# Patient Record
Sex: FEMALE | Race: WHITE | NOT HISPANIC OR LATINO | URBAN - METROPOLITAN AREA
[De-identification: names, ages, dates, MRNs, and addresses within clinical notes are randomized per-mention and may not be internally consistent; named-entity substitution may affect disease eponyms.]

---

## 2019-04-26 ENCOUNTER — OUTPATIENT (OUTPATIENT)
Dept: OUTPATIENT SERVICES | Facility: HOSPITAL | Age: 74
LOS: 1 days | End: 2019-04-26
Payer: MEDICARE

## 2019-04-26 ENCOUNTER — RESULT REVIEW (OUTPATIENT)
Age: 74
End: 2019-04-26

## 2019-04-26 ENCOUNTER — APPOINTMENT (OUTPATIENT)
Dept: ULTRASOUND IMAGING | Facility: CLINIC | Age: 74
End: 2019-04-26
Payer: MEDICARE

## 2019-04-26 DIAGNOSIS — N60.02 SOLITARY CYST OF LEFT BREAST: ICD-10-CM

## 2019-04-26 DIAGNOSIS — Z00.8 ENCOUNTER FOR OTHER GENERAL EXAMINATION: ICD-10-CM

## 2019-04-26 PROCEDURE — 19084 BX BREAST ADD LESION US IMAG: CPT | Mod: LT

## 2019-04-26 PROCEDURE — 19083 BX BREAST 1ST LESION US IMAG: CPT

## 2019-04-26 PROCEDURE — 88305 TISSUE EXAM BY PATHOLOGIST: CPT | Mod: 26

## 2019-04-26 PROCEDURE — 77065 DX MAMMO INCL CAD UNI: CPT

## 2019-04-26 PROCEDURE — 88377 M/PHMTRC ALYS ISHQUANT/SEMIQ: CPT | Mod: 26

## 2019-04-26 PROCEDURE — A4648: CPT

## 2019-04-26 PROCEDURE — 88305 TISSUE EXAM BY PATHOLOGIST: CPT

## 2019-04-26 PROCEDURE — 19083 BX BREAST 1ST LESION US IMAG: CPT | Mod: LT

## 2019-04-26 PROCEDURE — 88377 M/PHMTRC ALYS ISHQUANT/SEMIQ: CPT

## 2019-04-26 PROCEDURE — 88360 TUMOR IMMUNOHISTOCHEM/MANUAL: CPT

## 2019-04-26 PROCEDURE — 77065 DX MAMMO INCL CAD UNI: CPT | Mod: 26,LT

## 2019-04-26 PROCEDURE — 88360 TUMOR IMMUNOHISTOCHEM/MANUAL: CPT | Mod: 26

## 2019-04-26 PROCEDURE — 19084 BX BREAST ADD LESION US IMAG: CPT

## 2019-04-29 LAB — SURGICAL PATHOLOGY STUDY: SIGNIFICANT CHANGE UP

## 2019-08-16 ENCOUNTER — APPOINTMENT (OUTPATIENT)
Dept: RADIOLOGY | Facility: HOSPITAL | Age: 74
End: 2019-08-16
Payer: MEDICARE

## 2019-08-16 ENCOUNTER — OUTPATIENT (OUTPATIENT)
Dept: OUTPATIENT SERVICES | Facility: HOSPITAL | Age: 74
LOS: 1 days | End: 2019-08-16
Payer: MEDICARE

## 2019-08-16 DIAGNOSIS — Z00.8 ENCOUNTER FOR OTHER GENERAL EXAMINATION: ICD-10-CM

## 2019-08-16 PROCEDURE — 77080 DXA BONE DENSITY AXIAL: CPT | Mod: 26

## 2019-08-16 PROCEDURE — 77080 DXA BONE DENSITY AXIAL: CPT

## 2021-04-05 ENCOUNTER — APPOINTMENT (OUTPATIENT)
Dept: ULTRASOUND IMAGING | Facility: HOSPITAL | Age: 76
End: 2021-04-05
Payer: MEDICARE

## 2021-04-05 ENCOUNTER — OUTPATIENT (OUTPATIENT)
Dept: OUTPATIENT SERVICES | Facility: HOSPITAL | Age: 76
LOS: 1 days | End: 2021-04-05
Payer: MEDICARE

## 2021-04-05 DIAGNOSIS — Z00.8 ENCOUNTER FOR OTHER GENERAL EXAMINATION: ICD-10-CM

## 2021-04-05 PROCEDURE — 76770 US EXAM ABDO BACK WALL COMP: CPT | Mod: 26

## 2021-04-05 PROCEDURE — 76770 US EXAM ABDO BACK WALL COMP: CPT

## 2022-03-07 ENCOUNTER — APPOINTMENT (OUTPATIENT)
Dept: MAMMOGRAPHY | Facility: CLINIC | Age: 77
End: 2022-03-07
Payer: MEDICARE

## 2022-03-07 ENCOUNTER — APPOINTMENT (OUTPATIENT)
Dept: ULTRASOUND IMAGING | Facility: CLINIC | Age: 77
End: 2022-03-07
Payer: MEDICARE

## 2022-03-07 PROCEDURE — 76641 ULTRASOUND BREAST COMPLETE: CPT | Mod: RT

## 2022-03-07 PROCEDURE — G0279: CPT | Mod: RT

## 2022-03-07 PROCEDURE — 77065 DX MAMMO INCL CAD UNI: CPT | Mod: RT

## 2022-03-10 ENCOUNTER — TRANSCRIPTION ENCOUNTER (OUTPATIENT)
Age: 77
End: 2022-03-10

## 2022-06-13 ENCOUNTER — OUTPATIENT (OUTPATIENT)
Dept: OUTPATIENT SERVICES | Facility: HOSPITAL | Age: 77
LOS: 1 days | End: 2022-06-13
Payer: MEDICARE

## 2022-06-13 ENCOUNTER — APPOINTMENT (OUTPATIENT)
Dept: RADIOLOGY | Facility: HOSPITAL | Age: 77
End: 2022-06-13
Payer: MEDICARE

## 2022-06-13 DIAGNOSIS — M81.0 AGE-RELATED OSTEOPOROSIS WITHOUT CURRENT PATHOLOGICAL FRACTURE: ICD-10-CM

## 2022-06-13 PROCEDURE — 77080 DXA BONE DENSITY AXIAL: CPT

## 2022-06-13 PROCEDURE — 77080 DXA BONE DENSITY AXIAL: CPT | Mod: 26

## 2022-11-19 ENCOUNTER — NON-APPOINTMENT (OUTPATIENT)
Age: 77
End: 2022-11-19

## 2022-11-27 ENCOUNTER — INPATIENT (INPATIENT)
Facility: HOSPITAL | Age: 77
LOS: 2 days | Discharge: HOME | DRG: 308 | End: 2022-11-30
Attending: INTERNAL MEDICINE | Admitting: HOSPITALIST
Payer: MEDICARE

## 2022-11-27 VITALS
TEMPERATURE: 98 F | WEIGHT: 205.03 LBS | SYSTOLIC BLOOD PRESSURE: 125 MMHG | OXYGEN SATURATION: 97 % | HEIGHT: 64 IN | RESPIRATION RATE: 18 BRPM | HEART RATE: 134 BPM | DIASTOLIC BLOOD PRESSURE: 71 MMHG

## 2022-11-27 DIAGNOSIS — Z96.659 PRESENCE OF UNSPECIFIED ARTIFICIAL KNEE JOINT: Chronic | ICD-10-CM

## 2022-11-27 DIAGNOSIS — I48.91 UNSPECIFIED ATRIAL FIBRILLATION: ICD-10-CM

## 2022-11-27 DIAGNOSIS — Z90.12 ACQUIRED ABSENCE OF LEFT BREAST AND NIPPLE: Chronic | ICD-10-CM

## 2022-11-27 LAB
ALBUMIN SERPL ELPH-MCNC: 3.3 G/DL — SIGNIFICANT CHANGE UP (ref 3.3–5)
ALP SERPL-CCNC: 69 U/L — SIGNIFICANT CHANGE UP (ref 40–120)
ALT FLD-CCNC: 18 U/L — SIGNIFICANT CHANGE UP (ref 10–45)
ANION GAP SERPL CALC-SCNC: 12 MMOL/L — SIGNIFICANT CHANGE UP (ref 5–17)
AST SERPL-CCNC: 22 U/L — SIGNIFICANT CHANGE UP (ref 10–40)
BASOPHILS # BLD AUTO: 0.06 K/UL — SIGNIFICANT CHANGE UP (ref 0–0.2)
BASOPHILS NFR BLD AUTO: 0.5 % — SIGNIFICANT CHANGE UP (ref 0–2)
BILIRUB SERPL-MCNC: 0.5 MG/DL — SIGNIFICANT CHANGE UP (ref 0.2–1.2)
BUN SERPL-MCNC: 15 MG/DL — SIGNIFICANT CHANGE UP (ref 7–23)
CALCIUM SERPL-MCNC: 9.2 MG/DL — SIGNIFICANT CHANGE UP (ref 8.4–10.5)
CHLORIDE SERPL-SCNC: 105 MMOL/L — SIGNIFICANT CHANGE UP (ref 96–108)
CO2 SERPL-SCNC: 25 MMOL/L — SIGNIFICANT CHANGE UP (ref 22–31)
CREAT SERPL-MCNC: 0.91 MG/DL — SIGNIFICANT CHANGE UP (ref 0.5–1.3)
EGFR: 65 ML/MIN/1.73M2 — SIGNIFICANT CHANGE UP
EOSINOPHIL # BLD AUTO: 0.07 K/UL — SIGNIFICANT CHANGE UP (ref 0–0.5)
EOSINOPHIL NFR BLD AUTO: 0.6 % — SIGNIFICANT CHANGE UP (ref 0–6)
FLUAV AG NPH QL: SIGNIFICANT CHANGE UP
FLUBV AG NPH QL: SIGNIFICANT CHANGE UP
GLUCOSE SERPL-MCNC: 111 MG/DL — HIGH (ref 70–99)
HCT VFR BLD CALC: 43.5 % — SIGNIFICANT CHANGE UP (ref 34.5–45)
HGB BLD-MCNC: 14.4 G/DL — SIGNIFICANT CHANGE UP (ref 11.5–15.5)
IMM GRANULOCYTES NFR BLD AUTO: 0.4 % — SIGNIFICANT CHANGE UP (ref 0–0.9)
LYMPHOCYTES # BLD AUTO: 1.89 K/UL — SIGNIFICANT CHANGE UP (ref 1–3.3)
LYMPHOCYTES # BLD AUTO: 15.2 % — SIGNIFICANT CHANGE UP (ref 13–44)
MCHC RBC-ENTMCNC: 29.8 PG — SIGNIFICANT CHANGE UP (ref 27–34)
MCHC RBC-ENTMCNC: 33.1 GM/DL — SIGNIFICANT CHANGE UP (ref 32–36)
MCV RBC AUTO: 89.9 FL — SIGNIFICANT CHANGE UP (ref 80–100)
MONOCYTES # BLD AUTO: 1.01 K/UL — HIGH (ref 0–0.9)
MONOCYTES NFR BLD AUTO: 8.1 % — SIGNIFICANT CHANGE UP (ref 2–14)
NEUTROPHILS # BLD AUTO: 9.37 K/UL — HIGH (ref 1.8–7.4)
NEUTROPHILS NFR BLD AUTO: 75.2 % — SIGNIFICANT CHANGE UP (ref 43–77)
NRBC # BLD: 0 /100 WBCS — SIGNIFICANT CHANGE UP (ref 0–0)
NT-PROBNP SERPL-SCNC: 1748 PG/ML — HIGH (ref 0–300)
PLATELET # BLD AUTO: 277 K/UL — SIGNIFICANT CHANGE UP (ref 150–400)
POTASSIUM SERPL-MCNC: 4.1 MMOL/L — SIGNIFICANT CHANGE UP (ref 3.5–5.3)
POTASSIUM SERPL-SCNC: 4.1 MMOL/L — SIGNIFICANT CHANGE UP (ref 3.5–5.3)
PROT SERPL-MCNC: 7.6 G/DL — SIGNIFICANT CHANGE UP (ref 6–8.3)
RBC # BLD: 4.84 M/UL — SIGNIFICANT CHANGE UP (ref 3.8–5.2)
RBC # FLD: 12.5 % — SIGNIFICANT CHANGE UP (ref 10.3–14.5)
RSV RNA NPH QL NAA+NON-PROBE: SIGNIFICANT CHANGE UP
SARS-COV-2 RNA SPEC QL NAA+PROBE: SIGNIFICANT CHANGE UP
SODIUM SERPL-SCNC: 142 MMOL/L — SIGNIFICANT CHANGE UP (ref 135–145)
TROPONIN I, HIGH SENSITIVITY RESULT: 14.3 NG/L — SIGNIFICANT CHANGE UP
TROPONIN I, HIGH SENSITIVITY RESULT: 17.8 NG/L — SIGNIFICANT CHANGE UP
TSH SERPL-MCNC: 1.38 UIU/ML — SIGNIFICANT CHANGE UP (ref 0.36–3.74)
WBC # BLD: 12.45 K/UL — HIGH (ref 3.8–10.5)
WBC # FLD AUTO: 12.45 K/UL — HIGH (ref 3.8–10.5)

## 2022-11-27 PROCEDURE — 71275 CT ANGIOGRAPHY CHEST: CPT | Mod: 26

## 2022-11-27 PROCEDURE — 71045 X-RAY EXAM CHEST 1 VIEW: CPT | Mod: 26

## 2022-11-27 PROCEDURE — 99223 1ST HOSP IP/OBS HIGH 75: CPT

## 2022-11-27 PROCEDURE — 99291 CRITICAL CARE FIRST HOUR: CPT

## 2022-11-27 PROCEDURE — 93010 ELECTROCARDIOGRAM REPORT: CPT

## 2022-11-27 RX ORDER — DILTIAZEM HCL 120 MG
30 CAPSULE, EXT RELEASE 24 HR ORAL ONCE
Refills: 0 | Status: COMPLETED | OUTPATIENT
Start: 2022-11-27 | End: 2022-11-27

## 2022-11-27 RX ORDER — ENOXAPARIN SODIUM 100 MG/ML
90 INJECTION SUBCUTANEOUS EVERY 12 HOURS
Refills: 0 | Status: DISCONTINUED | OUTPATIENT
Start: 2022-11-27 | End: 2022-11-29

## 2022-11-27 RX ORDER — DILTIAZEM HCL 120 MG
10 CAPSULE, EXT RELEASE 24 HR ORAL ONCE
Refills: 0 | Status: COMPLETED | OUTPATIENT
Start: 2022-11-27 | End: 2022-11-27

## 2022-11-27 RX ORDER — LISINOPRIL 2.5 MG/1
0 TABLET ORAL
Qty: 0 | Refills: 0 | DISCHARGE

## 2022-11-27 RX ORDER — ACETAMINOPHEN 500 MG
650 TABLET ORAL EVERY 6 HOURS
Refills: 0 | Status: DISCONTINUED | OUTPATIENT
Start: 2022-11-27 | End: 2022-11-30

## 2022-11-27 RX ORDER — ANASTROZOLE 1 MG/1
1 TABLET ORAL DAILY
Refills: 0 | Status: DISCONTINUED | OUTPATIENT
Start: 2022-11-27 | End: 2022-11-30

## 2022-11-27 RX ORDER — METHOCARBAMOL 500 MG/1
750 TABLET, FILM COATED ORAL ONCE
Refills: 0 | Status: COMPLETED | OUTPATIENT
Start: 2022-11-27 | End: 2022-11-27

## 2022-11-27 RX ORDER — LANOLIN ALCOHOL/MO/W.PET/CERES
3 CREAM (GRAM) TOPICAL AT BEDTIME
Refills: 0 | Status: DISCONTINUED | OUTPATIENT
Start: 2022-11-27 | End: 2022-11-30

## 2022-11-27 RX ORDER — DILTIAZEM HCL 120 MG
30 CAPSULE, EXT RELEASE 24 HR ORAL EVERY 6 HOURS
Refills: 0 | Status: DISCONTINUED | OUTPATIENT
Start: 2022-11-27 | End: 2022-11-28

## 2022-11-27 RX ORDER — SODIUM CHLORIDE 9 MG/ML
1000 INJECTION INTRAMUSCULAR; INTRAVENOUS; SUBCUTANEOUS ONCE
Refills: 0 | Status: COMPLETED | OUTPATIENT
Start: 2022-11-27 | End: 2022-11-27

## 2022-11-27 RX ORDER — ACETAMINOPHEN 500 MG
650 TABLET ORAL ONCE
Refills: 0 | Status: COMPLETED | OUTPATIENT
Start: 2022-11-27 | End: 2022-11-27

## 2022-11-27 RX ORDER — LEVOFLOXACIN 5 MG/ML
500 INJECTION, SOLUTION INTRAVENOUS EVERY 24 HOURS
Refills: 0 | Status: DISCONTINUED | OUTPATIENT
Start: 2022-11-28 | End: 2022-11-28

## 2022-11-27 RX ORDER — ONDANSETRON 8 MG/1
4 TABLET, FILM COATED ORAL EVERY 8 HOURS
Refills: 0 | Status: DISCONTINUED | OUTPATIENT
Start: 2022-11-27 | End: 2022-11-30

## 2022-11-27 RX ADMIN — Medication 10 MILLIGRAM(S): at 13:54

## 2022-11-27 RX ADMIN — Medication 30 MILLIGRAM(S): at 16:03

## 2022-11-27 RX ADMIN — Medication 650 MILLIGRAM(S): at 13:55

## 2022-11-27 RX ADMIN — ENOXAPARIN SODIUM 90 MILLIGRAM(S): 100 INJECTION SUBCUTANEOUS at 19:20

## 2022-11-27 RX ADMIN — SODIUM CHLORIDE 1000 MILLILITER(S): 9 INJECTION INTRAMUSCULAR; INTRAVENOUS; SUBCUTANEOUS at 13:54

## 2022-11-27 RX ADMIN — Medication 10 MILLIGRAM(S): at 16:04

## 2022-11-27 RX ADMIN — SODIUM CHLORIDE 1000 MILLILITER(S): 9 INJECTION INTRAMUSCULAR; INTRAVENOUS; SUBCUTANEOUS at 15:00

## 2022-11-27 RX ADMIN — METHOCARBAMOL 750 MILLIGRAM(S): 500 TABLET, FILM COATED ORAL at 13:55

## 2022-11-27 NOTE — ED ADULT TRIAGE NOTE - CHIEF COMPLAINT QUOTE
sent Dr. Blank for CXR. Pt has had cough x 3 weeks. sent Dr. Blank for CXR. Pt has had cough x 3 weeks. pt tachycardic in triage

## 2022-11-27 NOTE — ED ADULT NURSE REASSESSMENT NOTE - NS ED NURSE REASSESS COMMENT FT1
Pt provided with dinner tray. Purposeful rounding performed. Call bell within reach. Son at bedside.

## 2022-11-27 NOTE — ED PROVIDER NOTE - CLINICAL SUMMARY MEDICAL DECISION MAKING FREE TEXT BOX
77-year-old female past medical history of hypertension presents to the ED complaining of cough for at least 1 week.  Positive congestion.  Patient went to urgent care and was given medication, Tessalon Perles and has been taking Mucinex as needed for cough as well.  Patient notes that recently she developed a knot in her neck on the right side posterior aspect due to significant coughing.  Patient states she massaged it a bit and took some over-the-counter Tylenol with relief.  However still reports some stiffness.  No chest pain or palpitations.  No shortness of breath.  Patient called her primary care doctor and told her to come to the ER for a chest x-ray.  No fever reported.  Exam as stated.  Patient noted to be tachycardic with an irregular rhythm.  EKG demonstrates rapid A. fib.  New for patient.  She has no past medical history of atrial fibrillation.  Patient denies palpitations and chest pain.  No dizziness or shortness of breath.  Likely situational.  We will medically manage and support.  Reassess vital signs.  Flu COVID RSV test sent 77-year-old female past medical history of hypertension presents to the ED complaining of cough for at least 1 week.  Positive congestion.  Patient went to urgent care and was given medication, Tessalon Perles and has been taking Mucinex as needed for cough as well.  Patient notes that recently she developed a knot in her neck on the right side posterior aspect due to significant coughing.  Patient states she massaged it a bit and took some over-the-counter Tylenol with relief.  However still reports some stiffness.  No chest pain or palpitations.  No shortness of breath.  Patient called her primary care doctor and told her to come to the ER for a chest x-ray.  No fever reported.  Exam as stated.  Patient noted to be tachycardic with an irregular rhythm.  EKG demonstrates rapid A. fib.  New for patient.  She has no past medical history of atrial fibrillation.  Patient denies palpitations and chest pain.  No dizziness or shortness of breath.  Likely situational.  We will medically manage and support.  Reassess vital signs.  Flu COVID RSV test sent    Rate improved but still in Afib. D/W PCP Abhay. [facility] admission. D/W Pt. After discussing full risks (as she wished to go home despite findings), pt okay with admission. Family at bedside. Linear atelectasis in right base. Will cover w Levaquin. CTangio ordered given remote h/o Breast Ca with cough and new rapid afib. Dr Paredes, hospitalist, will follow up result.

## 2022-11-27 NOTE — H&P ADULT - ASSESSMENT
76 yo female with PMH HTN, breast CA (s/p L mastectomy 2019, radiation), polymyalgia rheumatica presented to ED for cough, neck pain. Found to be in rapid A Fib.    #New onset atrial fibrillation with RVR  #r/o PE  - Admit to tele, continuous tele monitoring  - HR improved following IV and PO dilitazem  - TSH normal  - Initial trop 14.3, f/u repeat trop  - Serial EKGs  - f/u CTA of chest  - Check TTE  - Continue rate control with ________  - CHADSVASC 4 -- start AC with ______  - Cardiology consult pending - contacted answering service     #Cough  - Suspect viral in nature as has been present x3 weeks  - RVP/COVID negative  - CXR with right base atelectasis  - s/p 1 dose levaquin  - Will monitor off abx for now     #HTN  - On lisinopril-HCTZ at home, will hold for now    #hx Breast CA  - Continue home med - anastrazole     #DVT ppx           78 yo female with PMH HTN, breast CA (s/p L mastectomy 2019, radiation), polymyalgia rheumatica presented to ED for cough, neck pain. Found to be in rapid A Fib.    #New onset atrial fibrillation with RVR  #r/o PE  - Admit to tele, continuous tele monitoring  - HR improved following IV and PO dilitazem but still ~110s  - TSH normal  - Initial trop 14.3, f/u repeat trop  - Serial EKGs  - f/u CTA of chest  - Check TTE  - Continue rate control with diltiazem, may require gtt if unable to control with PO  - CHADSVASC 4 -- start AC with lovenox BID  - Cardiology consult pending - contacted answering service     #Cough  #Leukocytosis  - Suspect viral in nature as has been present x3 weeks, however patient with leukocytosis  - RVP/COVID negative  - CXR with right base atelectasis, f/u official read  - f/u CTA  - Check procalcitonin, check UA  - s/p 1 dose levaquin IV, will continue PO for now     #HTN  - On lisinopril-HCTZ at home, will hold for now    #hx Breast CA  - Continue home med - anastrazole     #DVT ppx  - Lovenox BID for now    Dispo: Pending above. Cardiology eval pending     Updated son at bedside          76 yo female with PMH HTN, breast CA (s/p L mastectomy 2019, radiation), polymyalgia rheumatica presented to ED for cough, neck pain. Found to be in rapid A Fib.    #New onset atrial fibrillation with RVR  #r/o PE  - Admit to tele, continuous tele monitoring  - HR improved following IV and PO dilitazem but still ~110s  - TSH normal  - Initial trop 14.3, f/u repeat trop  - Serial EKGs  - f/u CTA of chest  - Check TTE  - Continue rate control with diltiazem, may require gtt if unable to control with PO  - CHADSVASC 4 -- start AC with lovenox q12 for now  - Cardiology consult pending - contacted answering service     #Cough  #Leukocytosis  - Suspect viral in nature as has been present x3 weeks, however patient with leukocytosis  - RVP/COVID negative  - CXR with right base atelectasis, f/u official read  - f/u CTA  - Check procalcitonin, check UA  - s/p 1 dose levaquin IV, will continue PO for now     #HTN  - On lisinopril-HCTZ at home, will hold for now    #hx Breast CA  - Continue home med - anastrazole     #DVT ppx  - Lovenox q12 for now    Dispo: Pending above. Cardiology eval pending     Updated son-in-law at bedside

## 2022-11-27 NOTE — H&P ADULT - NSHPLABSRESULTS_GEN_ALL_CORE
14.4   12.45 )-----------( 277      ( 27 Nov 2022 13:50 )             43.5       11-27    142  |  105  |  15  ----------------------------<  111<H>  4.1   |  25  |  0.91    Ca    9.2      27 Nov 2022 13:50    TPro  7.6  /  Alb  3.3  /  TBili  0.5  /  DBili  x   /  AST  22  /  ALT  18  /  AlkPhos  69  11-27        Lactate Trend      CAPILLARY BLOOD GLUCOSE

## 2022-11-27 NOTE — H&P ADULT - NS ATTEND AMEND GEN_ALL_CORE FT
no ACS symptoms  diagnosis of a.fib/?flutter incidental upon ER eval  neck spasm resolved  dry cough  check UA  f/up CXR and CTA  f/up TTE and cardio consult  pt agreeable to initiate full dose AC  telemonitor  might need cardizem drip

## 2022-11-27 NOTE — ED PROVIDER NOTE - CHIEF COMPLAINT
Care Due:                  Date            Visit Type   Department     Provider  --------------------------------------------------------------------------------                                EP -                              PRIMARY      [facility] INTER[facility]  Last Visit: 11-      CARE ([facility])   MEDICINE       Chase Sharma  Next Visit: None Scheduled  None         None Found                                                            Last  Test          Frequency    Reason                     Performed    Due Date  --------------------------------------------------------------------------------    TSH.........  12 months..  levothyroxine............  05- 05-    Health system Embedded Care Gaps. Reference number: [id number]. 5/19/2022   11:19:53 PM CDT  
Refill Routing Note   Medication(s) are not appropriate for processing by [facility] for the following reason(s):      - Required laboratory values are outdated    [facility] action(s):  Defer  Approve Medication-related problems identified:   Requires labs  Requires appointment     Medication Therapy Plan: L-thyroxine outdated labs. Amlodipine/Valsartan approved  Medication reconciliation completed: No     Appointments  past 12m or future 3m with PCP    Date Provider   Last Visit   11/30/2021 Chase Sharma MD   Next Visit   Visit date not found Chase Sharma MD   ED visits in past 90 days: 0        Note composed:12:03 PM 05/20/2022           
The patient is a 77y Female complaining of cough.

## 2022-11-27 NOTE — H&P ADULT - NSHPSOCIALHISTORY_GEN_ALL_CORE
Lives at home with , independent of ADLs, denies tobacco/drugs, occasional ETOH Lives at home with , independent with ADLs, denies tobacco/drugs, occasional ETOH

## 2022-11-27 NOTE — ED PROVIDER NOTE - PHYSICAL EXAMINATION
General:     NAD, well-nourished, well-appearing  Eyes: PERRL  Head:     NC/AT, EOMI, oral mucosa moist  Neck:     trachea midline  Lungs:     CTA b/l  CVS:     Irregular and tachycardia  Abd:     +BS, s/nt/nd  Ext:   no deformities , No pitting edema, no calf tenderness, No midline spinal tenderness, Muscle spasm noted to the neck as there is limited range of motion with right lateral neck rotation  Neuro: AAOx3, no sensory/motor deficits  Skin: No rash or injuries noted

## 2022-11-27 NOTE — ED PROVIDER NOTE - OBJECTIVE STATEMENT
77-year-old female past medical history of hypertension presents to the ED complaining of cough for at least 1 week.  Positive congestion.  Patient went to urgent care and was given medication, Tessalon Perles and has been taking Mucinex as needed for cough as well.  Patient notes that recently she developed a knot in her neck on the right side posterior aspect due to significant coughing.  Patient states she massaged it a bit and took some over-the-counter Tylenol with relief.  However still reports some stiffness.  No chest pain or palpitations.  No shortness of breath.  Patient called her primary care doctor and told her to come to the ER for a chest x-ray.  No fever reported.

## 2022-11-27 NOTE — H&P ADULT - NSHPREVIEWOFSYSTEMS_GEN_ALL_CORE
REVIEW OF SYSTEMS:  CONSTITUTIONAL: No fever, weight loss, or fatigue  EYES: No eye pain, visual disturbances, or discharge  ENMT:  No difficulty hearing, tinnitus, vertigo; No sinus or throat pain  NECK: +Neck spasm.   BREASTS: No pain, masses, or nipple discharge  RESPIRATORY: +Cough, +check congestion. No wheezing, chills or hemoptysis; No shortness of breath  CARDIOVASCULAR: No chest pain, palpitations, dizziness, or leg swelling  GASTROINTESTINAL: No abdominal pain, No nausea, vomiting, or hematemesis; No diarrhea or constipation  GENITOURINARY: No dysuria, frequency, hematuria, or incontinence  NEUROLOGICAL: No headaches, memory loss, loss of strength, numbness, or tremors  SKIN: No itching, burning, rashes, or lesions   LYMPH NODES: No enlarged glands  ENDOCRINE: No heat or cold intolerance; No hair loss  MUSCULOSKELETAL: No joint pain or swelling; No muscle, back, or extremity pain  PSYCHIATRIC: No depression, anxiety, mood swings, or difficulty sleeping  HEME/LYMPH: No easy bruising or bleeding  ALLERGY AND IMMUNOLOGIC: No hives or eczema    All other ROS reviewed and negative except as otherwise stated

## 2022-11-27 NOTE — H&P ADULT - HISTORY OF PRESENT ILLNESS
78 yo female with PMH HTN, breast CA (s/p L mastectomy 2019, radiation) presented to ED for cough, neck pain. Cough began ~3 weeks ago, has been persistent, now patient feeling upper chest congestion and developed neck pain/spasm which she attributes to coughing. No sick contacts. She was advised by her PMD to go to ED for CXR. Upon arrival to ED patient found to be in rapid atrial fibrillation HR 130s. She was treated with diltiazem 10 mg IVP x2 followed by diltiazem 30 mg PO once with improvement, HR now ~110. Patient denies chest pain, shortness of breath, palpitations. Neck pain improved after robaxin. CXR in ED with linear atelectasis in right base, she was given one dose IV Levaquin. Initial trop 14.3. TSH normal. [id number]. RVP negative. CTA of chest ordered given remote h/o breast cancer with cough and new rapid afib, test still pending at this time.  78 yo female with PMH HTN, breast CA (s/p L mastectomy 2019, radiation), polymyalgia rheumatica presented to ED for cough, neck pain. Cough began ~3 weeks ago, has been persistent, now patient feeling upper chest congestion and developed neck pain/spasm which she attributes to coughing. No sick contacts. She was advised by her PMD to go to ED for CXR. Upon arrival to ED patient found to be in rapid atrial fibrillation HR 130s. She was treated with diltiazem 10 mg IVP x2 followed by diltiazem 30 mg PO once with improvement, HR now ~110. Patient denies chest pain, shortness of breath, palpitations. Neck pain improved after robaxin. CXR in ED with linear atelectasis in right base, she was given one dose IV Levaquin. Initial trop 14.3. TSH normal. [id number]. RVP/COVID negative. CTA of chest ordered given remote h/o breast cancer with cough and new rapid afib, test still pending at this time.

## 2022-11-27 NOTE — ED ADULT NURSE NOTE - OBJECTIVE STATEMENT
Patient presents to ED after receiving call from Dr Blank office. Dr JENNINGS requesting chest x ray. Pt has had cough for 3 weeks and complaining of neck pain.

## 2022-11-28 LAB
A1C WITH ESTIMATED AVERAGE GLUCOSE RESULT: 5.8 % — HIGH (ref 4–5.6)
ANION GAP SERPL CALC-SCNC: 11 MMOL/L — SIGNIFICANT CHANGE UP (ref 5–17)
APPEARANCE UR: CLEAR — SIGNIFICANT CHANGE UP
BACTERIA # UR AUTO: NEGATIVE /HPF — SIGNIFICANT CHANGE UP
BILIRUB UR-MCNC: NEGATIVE — SIGNIFICANT CHANGE UP
BUN SERPL-MCNC: 12 MG/DL — SIGNIFICANT CHANGE UP (ref 7–23)
CALCIUM SERPL-MCNC: 9.4 MG/DL — SIGNIFICANT CHANGE UP (ref 8.4–10.5)
CHLORIDE SERPL-SCNC: 106 MMOL/L — SIGNIFICANT CHANGE UP (ref 96–108)
CHOLEST SERPL-MCNC: 162 MG/DL — SIGNIFICANT CHANGE UP
CO2 SERPL-SCNC: 24 MMOL/L — SIGNIFICANT CHANGE UP (ref 22–31)
COLOR SPEC: YELLOW — SIGNIFICANT CHANGE UP
CREAT SERPL-MCNC: 0.79 MG/DL — SIGNIFICANT CHANGE UP (ref 0.5–1.3)
DIFF PNL FLD: ABNORMAL
EGFR: 77 ML/MIN/1.73M2 — SIGNIFICANT CHANGE UP
EPI CELLS # UR: SIGNIFICANT CHANGE UP
ESTIMATED AVERAGE GLUCOSE: 120 MG/DL — HIGH (ref 68–114)
GLUCOSE SERPL-MCNC: 109 MG/DL — HIGH (ref 70–99)
GLUCOSE UR QL: NEGATIVE — SIGNIFICANT CHANGE UP
HCT VFR BLD CALC: 44.9 % — SIGNIFICANT CHANGE UP (ref 34.5–45)
HCV AB S/CO SERPL IA: 0.19 S/CO — SIGNIFICANT CHANGE UP (ref 0–0.99)
HCV AB SERPL-IMP: SIGNIFICANT CHANGE UP
HDLC SERPL-MCNC: 54 MG/DL — SIGNIFICANT CHANGE UP
HGB BLD-MCNC: 14.9 G/DL — SIGNIFICANT CHANGE UP (ref 11.5–15.5)
KETONES UR-MCNC: NEGATIVE — SIGNIFICANT CHANGE UP
LEUKOCYTE ESTERASE UR-ACNC: ABNORMAL
LIPID PNL WITH DIRECT LDL SERPL: 96 MG/DL — SIGNIFICANT CHANGE UP
MCHC RBC-ENTMCNC: 30.5 PG — SIGNIFICANT CHANGE UP (ref 27–34)
MCHC RBC-ENTMCNC: 33.2 GM/DL — SIGNIFICANT CHANGE UP (ref 32–36)
MCV RBC AUTO: 92 FL — SIGNIFICANT CHANGE UP (ref 80–100)
NITRITE UR-MCNC: NEGATIVE — SIGNIFICANT CHANGE UP
NON HDL CHOLESTEROL: 109 MG/DL — SIGNIFICANT CHANGE UP
NRBC # BLD: 0 /100 WBCS — SIGNIFICANT CHANGE UP (ref 0–0)
PH UR: 6 — SIGNIFICANT CHANGE UP (ref 5–8)
PLATELET # BLD AUTO: 243 K/UL — SIGNIFICANT CHANGE UP (ref 150–400)
POTASSIUM SERPL-MCNC: 4.5 MMOL/L — SIGNIFICANT CHANGE UP (ref 3.5–5.3)
POTASSIUM SERPL-SCNC: 4.5 MMOL/L — SIGNIFICANT CHANGE UP (ref 3.5–5.3)
PROCALCITONIN SERPL-MCNC: 0.03 NG/ML — SIGNIFICANT CHANGE UP
PROT UR-MCNC: NEGATIVE — SIGNIFICANT CHANGE UP
RBC # BLD: 4.88 M/UL — SIGNIFICANT CHANGE UP (ref 3.8–5.2)
RBC # FLD: 12.7 % — SIGNIFICANT CHANGE UP (ref 10.3–14.5)
RBC CASTS # UR COMP ASSIST: SIGNIFICANT CHANGE UP /HPF (ref 0–4)
SODIUM SERPL-SCNC: 141 MMOL/L — SIGNIFICANT CHANGE UP (ref 135–145)
SP GR SPEC: 1.01 — SIGNIFICANT CHANGE UP (ref 1.01–1.02)
TRIGL SERPL-MCNC: 62 MG/DL — SIGNIFICANT CHANGE UP
UROBILINOGEN FLD QL: NEGATIVE — SIGNIFICANT CHANGE UP
WBC # BLD: 9.73 K/UL — SIGNIFICANT CHANGE UP (ref 3.8–10.5)
WBC # FLD AUTO: 9.73 K/UL — SIGNIFICANT CHANGE UP (ref 3.8–10.5)
WBC UR QL: SIGNIFICANT CHANGE UP /HPF (ref 0–5)

## 2022-11-28 PROCEDURE — 99222 1ST HOSP IP/OBS MODERATE 55: CPT

## 2022-11-28 PROCEDURE — 93306 TTE W/DOPPLER COMPLETE: CPT | Mod: 26

## 2022-11-28 PROCEDURE — 93010 ELECTROCARDIOGRAM REPORT: CPT

## 2022-11-28 RX ORDER — METHOCARBAMOL 500 MG/1
750 TABLET, FILM COATED ORAL ONCE
Refills: 0 | Status: COMPLETED | OUTPATIENT
Start: 2022-11-28 | End: 2022-11-28

## 2022-11-28 RX ORDER — LISINOPRIL 2.5 MG/1
10 TABLET ORAL DAILY
Refills: 0 | Status: DISCONTINUED | OUTPATIENT
Start: 2022-11-28 | End: 2022-11-30

## 2022-11-28 RX ORDER — METOPROLOL TARTRATE 50 MG
50 TABLET ORAL THREE TIMES A DAY
Refills: 0 | Status: DISCONTINUED | OUTPATIENT
Start: 2022-11-28 | End: 2022-11-29

## 2022-11-28 RX ADMIN — ENOXAPARIN SODIUM 90 MILLIGRAM(S): 100 INJECTION SUBCUTANEOUS at 05:49

## 2022-11-28 RX ADMIN — Medication 50 MILLIGRAM(S): at 11:45

## 2022-11-28 RX ADMIN — Medication 30 MILLIGRAM(S): at 00:17

## 2022-11-28 RX ADMIN — LISINOPRIL 10 MILLIGRAM(S): 2.5 TABLET ORAL at 05:50

## 2022-11-28 RX ADMIN — Medication 30 MILLIGRAM(S): at 05:50

## 2022-11-28 RX ADMIN — Medication 50 MILLIGRAM(S): at 22:52

## 2022-11-28 RX ADMIN — ENOXAPARIN SODIUM 90 MILLIGRAM(S): 100 INJECTION SUBCUTANEOUS at 18:02

## 2022-11-28 RX ADMIN — METHOCARBAMOL 750 MILLIGRAM(S): 500 TABLET, FILM COATED ORAL at 18:46

## 2022-11-28 RX ADMIN — ANASTROZOLE 1 MILLIGRAM(S): 1 TABLET ORAL at 12:03

## 2022-11-28 NOTE — CONSULT NOTE ADULT - ASSESSMENT
Assessment:  Alma Lacey is a 77 year old woman with past medical history of Breast cancer (s/p radiation and mastectomy), Polymyalgia rheumatic and Hypertension presented with cough and neck pain, found to have atrial fibrillation with RVR.     ECG consistent with atrial fibrillation with RVR and nonspecific ST abnormalities, no prior ECG to compare to. Troponins negative x 2. CTPA negative for pulmonary embolism.  Assessment:  Alma Lacey is a 77 year old obese woman with past medical history of Breast cancer (s/p radiation and mastectomy), Polymyalgia rheumatic and Hypertension presented with cough and neck pain, found to have atrial fibrillation with RVR.     ECG consistent with atrial fibrillation with RVR and nonspecific ST abnormalities, no prior ECG to compare to. Troponins negative x 2. CTPA negative for pulmonary embolism.     Recommendations:  [] Atrial fibrillation with RVR: AF likely secondary to multiple risk factors including obesity. TSH normal. HR elevated, would switch from Diltiazem to Metoprolol tartrate 50 mg PO TID. Due to elevated [id number] score agree with anticoagulation for stroke prophylaxis, currently receiving lovenox, would transition to Eliquis prior to discharge. Continue to monitor on telemetry. Follow up echo to evaluate LVEF and for valvular disease.     We will continue to follow along.    Kelin Nielsen MD  Cardiology

## 2022-11-28 NOTE — PROGRESS NOTE ADULT - ASSESSMENT
AFIB - Patient currently cardiac stable. will continue Beta blocker and AC. case discussed with patient and NP  HTN - BP is stable. will continue meds  Pneumonia - patient is respiratory stable. will continue Levaquin. recommend follow up with pulmonary MD as an out patient. case discussed with NP and patient   Left breast cancer - patient is clinically stable. recommend follow up with oncologist/surgeon as an out patient        Case discussed with NP/patient/patient's daughter

## 2022-11-28 NOTE — CONSULT NOTE ADULT - SUBJECTIVE AND OBJECTIVE BOX
AUGUSTINE SNELL  [id number]      HPI:  76 yo female with PMH HTN, breast CA (s/p L mastectomy 2019, radiation), polymyalgia rheumatica presented to ED for cough, neck pain. Cough began ~3 weeks ago, has been persistent, now patient feeling upper chest congestion and developed neck pain/spasm which she attributes to coughing. No sick contacts. She was advised by her PMD to go to ED for CXR. Upon arrival to ED patient found to be in rapid atrial fibrillation HR 130s. She was treated with diltiazem 10 mg IVP x2 followed by diltiazem 30 mg PO once with improvement, HR now ~110. Patient denies chest pain, shortness of breath, palpitations. Neck pain improved after robaxin. CXR in ED with linear atelectasis in right base, she was given one dose IV Levaquin. Initial trop 14.3. TSH normal. [id number]. RVP/COVID negative. CTA of chest ordered given remote h/o breast cancer with cough and new rapid afib, test still pending at this time.  (27 Nov 2022 17:03)        ALLERGIES:  NSAIDs (Anaphylaxis)      PAST MEDICAL & SURGICAL HISTORY:  Mild HTN      Polymyalgia rheumatica      History of mastectomy, left      History of knee replacement  bilateral            CURRENT MEDICATIONS:  acetaminophen     Tablet .. 650 milliGRAM(s) Oral every 6 hours PRN  aluminum hydroxide/magnesium hydroxide/simethicone Suspension 30 milliLiter(s) Oral every 4 hours PRN  anastrozole 1 milliGRAM(s) Oral daily  diltiazem    Tablet 30 milliGRAM(s) Oral every 6 hours  enoxaparin Injectable 90 milliGRAM(s) SubCutaneous every 12 hours  hydrochlorothiazide 12.5 milliGRAM(s) Oral daily  levoFLOXacin  Tablet 500 milliGRAM(s) Oral every 24 hours  lisinopril 10 milliGRAM(s) Oral daily  melatonin 3 milliGRAM(s) Oral at bedtime PRN  ondansetron Injectable 4 milliGRAM(s) IV Push every 8 hours PRN      SOCIAL HISTORY:      FAMILY HISTORY:  FH: pancreatic cancer (Father)        ROS:  All 10 systems reviewed and positives noted in HPI    OBJECTIVE:    VITAL SIGNS:  Vital Signs Last 24 Hrs  T(C): 36.7 (28 Nov 2022 08:38), Max: 36.9 (28 Nov 2022 05:17)  T(F): 98 (28 Nov 2022 08:38), Max: 98.4 (28 Nov 2022 05:17)  HR: 110 (28 Nov 2022 08:38) (94 - 136)  BP: 137/85 (28 Nov 2022 08:38) (117/85 - 142/60)  BP(mean): --  RR: 17 (28 Nov 2022 08:38) (17 - 19)  SpO2: 97% (28 Nov 2022 08:38) (95% - 100%)    Parameters below as of 28 Nov 2022 05:17  Patient On (Oxygen Delivery Method): room air        PHYSICAL EXAM:  General: well appearing, no distress  HEENT: sclera anicteric  Neck: supple, no carotid bruits b/l  CVS: JVP ~ 7 cm H20, RRR, s1, s2, no murmurs/rubs/gallops  Chest: unlabored respirations, clear to auscultation b/l  Abdomen: non-distended  Extremities: no lower extremity edema b/l  Neuro: awake, alert & oriented x 3  Psych: normal affect      LABS:                        14.9   9.73  )-----------( 243      ( 28 Nov 2022 06:18 )             44.9     11-28    141  |  106  |  12  ----------------------------<  109<H>  4.5   |  24  |  0.79    Ca    9.4      28 Nov 2022 06:18    TPro  7.6  /  Alb  3.3  /  TBili  0.5  /  DBili  x   /  AST  22  /  ALT  18  /  AlkPhos  69  11-27          ECG (11/27/22): atrial fibrillation with RVR, nonspecific ST abnormalities (no prior ECG to compare to)     CTPA (11/27/22):  No evidence of acute pulmonary embolism. Peripheral left lower lobe   opacity likely representing peripheral subsegmental atelectasis. Patchy nodule in the left upper lobe groundglass nodules in the posterior right upper lobe may represent infectious or inflammatory etiology, recommend follow-up CT in 4-6 weeks.    No segmental consolidations. AUGUSTINE SNELL  [id number]      HPI:    Augustine Snell is a 77 year old obese woman with past medical history of Breast cancer (s/p radiation and mastectomy), Polymyalgia rheumatic and Hypertension presented with cough and neck pain, found to have atrial fibrillation with RVR.     The patient reports that for the past few weeks she has had persistent cough and due to this has developed neck discomfort. Denies chest pain, shortness of breath, palpitations or presyncope/syncope. Denies any recent fevers. Received COVID vaccination booster a few weeks ago.       ALLERGIES:  NSAIDs (Anaphylaxis)      PAST MEDICAL & SURGICAL HISTORY:  Breast cancer (s/p radiation and mastectomy)  Polymyalgia rheumatic  Hypertension  History of knee replacement      CURRENT MEDICATIONS:  acetaminophen     Tablet .. 650 milliGRAM(s) Oral every 6 hours PRN  aluminum hydroxide/magnesium hydroxide/simethicone Suspension 30 milliLiter(s) Oral every 4 hours PRN  anastrozole 1 milliGRAM(s) Oral daily  diltiazem    Tablet 30 milliGRAM(s) Oral every 6 hours  enoxaparin Injectable 90 milliGRAM(s) SubCutaneous every 12 hours  hydrochlorothiazide 12.5 milliGRAM(s) Oral daily  levoFLOXacin  Tablet 500 milliGRAM(s) Oral every 24 hours  lisinopril 10 milliGRAM(s) Oral daily  melatonin 3 milliGRAM(s) Oral at bedtime PRN  ondansetron Injectable 4 milliGRAM(s) IV Push every 8 hours PRN      SOCIAL HISTORY:  Denies drinking alcohol  Denies smoking cigarettes    FAMILY HISTORY:  FH: pancreatic cancer (Father)        ROS:  All 10 systems reviewed and positives noted in HPI    OBJECTIVE:    VITAL SIGNS:  Vital Signs Last 24 Hrs  T(C): 36.7 (28 Nov 2022 08:38), Max: 36.9 (28 Nov 2022 05:17)  T(F): 98 (28 Nov 2022 08:38), Max: 98.4 (28 Nov 2022 05:17)  HR: 110 (28 Nov 2022 08:38) (94 - 136)  BP: 137/85 (28 Nov 2022 08:38) (117/85 - 142/60)  BP(mean): --  RR: 17 (28 Nov 2022 08:38) (17 - 19)  SpO2: 97% (28 Nov 2022 08:38) (95% - 100%)    Parameters below as of 28 Nov 2022 05:17  Patient On (Oxygen Delivery Method): room air        PHYSICAL EXAM:  General: obese woman, no acute distress  HEENT: sclera anicteric  Neck: supple, no carotid bruits b/l  CVS: JVP ~ 7 cm H20, irregularly irregular, s1, s2  Chest: unlabored respirations, clear to auscultation b/l  Abdomen: obese  Extremities: no lower extremity edema b/l  Neuro: awake, alert & oriented x 3  Psych: normal affect      LABS:                        14.9   9.73  )-----------( 243      ( 28 Nov 2022 06:18 )             44.9     11-28    141  |  106  |  12  ----------------------------<  109<H>  4.5   |  24  |  0.79    Ca    9.4      28 Nov 2022 06:18    TPro  7.6  /  Alb  3.3  /  TBili  0.5  /  DBili  x   /  AST  22  /  ALT  18  /  AlkPhos  69  11-27          ECG (11/27/22): atrial fibrillation with RVR, nonspecific ST abnormalities (no prior ECG to compare to)     CTPA (11/27/22):  No evidence of acute pulmonary embolism. Peripheral left lower lobe   opacity likely representing peripheral subsegmental atelectasis. Patchy nodule in the left upper lobe groundglass nodules in the posterior right upper lobe may represent infectious or inflammatory etiology, recommend follow-up CT in 4-6 weeks.    No segmental consolidations.

## 2022-11-28 NOTE — CHART NOTE - NSCHARTNOTEFT_GEN_A_CORE
Reviewed prior progress notes, labs and imaging.    Discussed with Dr Rivas, Dr Nielsen    Primary Diagnosis: New onset A Fib    76 yo female with PMH HTN, breast CA (s/p L mastectomy 2019, radiation), polymyalgia rheumatica presented to ED for cough, neck pain. Found to be in rapid A Fib.    #New onset atrial fibrillation with RVR  - Continue tele monitoring  - ECG: A Fib with RVR and nonspecific ST abnormalities  - Trops negative x 2  - CTA chest negative for PE  - TSH normal  - HR still elevated - will switch from cardizem to Metoprolol 50 mg TID  - Continue lovenox, switch to eliquis once HR better controlled           Recommendations:  [] Atrial fibrillation with RVR: AF likely secondary to multiple risk factors including obesity. TSH normal. HR elevated, would switch from Diltiazem to Metoprolol tartrate 50 mg PO TID. Due to elevated [id number] score agree with anticoagulation for stroke prophylaxis, currently receiving lovenox, would transition to Eliquis prior to discharge. Continue to monitor on telemetry. Follow up echo to evaluate LVEF and for valvular disease.     - HR improved following IV and PO dilitazem but still ~110s    - Initial trop 14.3, f/u repeat trop  - Serial EKGs  - f/u CTA of chest  - Check TTE  - Continue rate control with diltiazem, may require gtt if unable to control with PO  - CHADSVASC 4 -- start AC with lovenox q12 for now  - Cardiology consult pending - contacted answering service     #Cough  #Leukocytosis  - Suspect viral in nature as has been present x3 weeks, however patient with leukocytosis  - RVP/COVID negative  - CXR with right base atelectasis, f/u official read  - f/u CTA  - Check procalcitonin, check UA  - s/p 1 dose levaquin IV, will continue PO for now     #HTN  - On lisinopril-HCTZ at home, will hold for now    #hx Breast CA  - Continue home med - anastrazole     #DVT ppx  - Lovenox q12 for now    Dispo: Pending above. Cardiology eval pending Reviewed prior progress notes, labs and imaging.    Discussed with Dr Rivas, Dr Nielsen    Primary Diagnosis: New onset A Fib    76 yo female with PMH HTN, breast CA (s/p L mastectomy 2019, radiation), polymyalgia rheumatica presented to ED for cough, neck pain. Found to be in rapid A Fib.    #New onset atrial fibrillation with RVR  - Continue tele monitoring  - ECG: A Fib with RVR and nonspecific ST abnormalities  - Trops negative x 2  - CTA chest negative for PE  - TSH normal  - HR still elevated - will switch from cardizem to Metoprolol 50 mg TID  - Continue lovenox, switch to eliquis once HR better controlled             - Initial trop 14.3, f/u repeat trop  - Serial EKGs  - f/u CTA of chest  - Check TTE  - Continue rate control with diltiazem, may require gtt if unable to control with PO  - CHADSVASC 4 -- start AC with lovenox q12 for now  - Cardiology consult pending - contacted answering service     #Cough  #Leukocytosis  - Suspect viral in nature as has been present x3 weeks, however patient with leukocytosis  - RVP/COVID negative  - CXR with right base atelectasis, f/u official read  - f/u CTA  - Check procalcitonin, check UA  - s/p 1 dose levaquin IV, will continue PO for now     #HTN  - On lisinopril-HCTZ at home, will hold for now    #hx Breast CA  - Continue home med - anastrazole     #DVT ppx  - Lovenox q12 for now    Dispo: Pending above. Cardiology eval pending Reviewed prior progress notes, labs and imaging.    Discussed with Dr Rivas, Dr Nielsen    Primary Diagnosis: New onset A Fib    76 yo female with PMH HTN, breast CA (s/p L mastectomy 2019, radiation), polymyalgia rheumatica presented to ED for cough, neck pain. Found to be in rapid A Fib.    #New onset atrial fibrillation with RVR  - Continue tele monitoring  - ECG: A Fib with RVR and nonspecific ST abnormalities  - Trops negative x 2  - CTA chest negative for PE  - TSH normal  - HR still elevated - will switch from cardizem to Metoprolol 50 mg TID  - Continue lovenox, switch to eliquis once HR better controlled   - f/u TTE  - Cardiology following     #Cough - improved   #Leukocytosis - resolved   - Suspect viral   - PCT negative  - RVP/COVID negative  - Leukocytosis resolved  - Will DC abx    #HTN  - Continue home med - lisinopril-HCTZ     #hx Breast CA  - Continue home med - anastrazole     #DVT ppx  - Lovenox q12 for now    Dispo: Pending above. Home once medically cleared. Anticipate another ~24 hours

## 2022-11-29 PROCEDURE — 99232 SBSQ HOSP IP/OBS MODERATE 35: CPT

## 2022-11-29 RX ORDER — APIXABAN 2.5 MG/1
1 TABLET, FILM COATED ORAL
Qty: 60 | Refills: 0
Start: 2022-11-29 | End: 2022-12-28

## 2022-11-29 RX ORDER — DIGOXIN 250 MCG
250 TABLET ORAL EVERY 6 HOURS
Refills: 0 | Status: COMPLETED | OUTPATIENT
Start: 2022-11-29 | End: 2022-11-30

## 2022-11-29 RX ORDER — METOPROLOL TARTRATE 50 MG
100 TABLET ORAL ONCE
Refills: 0 | Status: COMPLETED | OUTPATIENT
Start: 2022-11-29 | End: 2022-11-29

## 2022-11-29 RX ORDER — DIGOXIN 250 MCG
500 TABLET ORAL ONCE
Refills: 0 | Status: COMPLETED | OUTPATIENT
Start: 2022-11-29 | End: 2022-11-29

## 2022-11-29 RX ORDER — FUROSEMIDE 40 MG
40 TABLET ORAL ONCE
Refills: 0 | Status: COMPLETED | OUTPATIENT
Start: 2022-11-29 | End: 2022-11-29

## 2022-11-29 RX ORDER — DIGOXIN 250 MCG
250 TABLET ORAL DAILY
Refills: 0 | Status: DISCONTINUED | OUTPATIENT
Start: 2022-12-01 | End: 2022-11-30

## 2022-11-29 RX ORDER — APIXABAN 2.5 MG/1
5 TABLET, FILM COATED ORAL EVERY 12 HOURS
Refills: 0 | Status: DISCONTINUED | OUTPATIENT
Start: 2022-11-29 | End: 2022-11-30

## 2022-11-29 RX ORDER — METOPROLOL TARTRATE 50 MG
100 TABLET ORAL
Refills: 0 | Status: DISCONTINUED | OUTPATIENT
Start: 2022-11-29 | End: 2022-11-30

## 2022-11-29 RX ADMIN — APIXABAN 5 MILLIGRAM(S): 2.5 TABLET, FILM COATED ORAL at 17:43

## 2022-11-29 RX ADMIN — Medication 500 MICROGRAM(S): at 17:43

## 2022-11-29 RX ADMIN — Medication 40 MILLIGRAM(S): at 09:20

## 2022-11-29 RX ADMIN — ENOXAPARIN SODIUM 90 MILLIGRAM(S): 100 INJECTION SUBCUTANEOUS at 05:53

## 2022-11-29 RX ADMIN — Medication 100 MILLIGRAM(S): at 17:46

## 2022-11-29 RX ADMIN — Medication 250 MICROGRAM(S): at 23:45

## 2022-11-29 RX ADMIN — Medication 100 MILLIGRAM(S): at 09:57

## 2022-11-29 RX ADMIN — ANASTROZOLE 1 MILLIGRAM(S): 1 TABLET ORAL at 11:45

## 2022-11-29 NOTE — PROGRESS NOTE ADULT - ASSESSMENT
Assessment:  Alma Lacey is a 77 year old obese woman with past medical history of Breast cancer (s/p radiation and mastectomy), Polymyalgia rheumatic and Hypertension presented with cough and neck pain, found to have atrial fibrillation with RVR.     ECG consistent with atrial fibrillation with RVR and nonspecific ST abnormalities, no prior ECG to compare to. Troponins negative x 2. CTPA negative for pulmonary embolism.     Echo consistent with normal LVEF 55-60%, normal RV size and function, severe biatrial enlargement, mild valvular disease, mild pulmonary hypertension, RAP ~ 15 mmHg.    Recommendations:  [] Atrial fibrillation with RVR: AF likely secondary to multiple risk factors predominantly hypertension and obestiy. TSH normal. HR elevated, increase Metoprolol tartrate to 100 mg BID.  Due to elevated [id number] score agree with anticoagulation for stroke prophylaxis, currently receiving lovenox, would transition to Eliquis prior to discharge. Continue to monitor on telemetry. Discussed option of WAGNER/DCCV with patient but she prefers medical management at this time. Would ensure HR 100s or less with ambulation prior to discharge. Likely plan for outpatient ischemic evaluation.  [] Mild CHF, possible HFpEF: Likely secondary to AF, recommend Lasix 40 mg IVP once today, will need PO Lasix on discharge. AF treatment as per above.     Discussed with primary team. We will continue to follow along.    Kelin Nielsen MD  Cardiology

## 2022-11-29 NOTE — CHART NOTE - NSCHARTNOTEFT_GEN_A_CORE
Reviewed prior progress notes, labs and imaging.    Discussed with Dr Rivas, Dr Nielsen    Primary Diagnosis: New onset A Fib    78 yo female with PMH HTN, breast CA (s/p L mastectomy 2019, radiation), polymyalgia rheumatica presented to ED for cough, neck pain. Found to be in rapid A Fib.    #New onset atrial fibrillation with RVR  - Continue tele monitoring  - ECG: A Fib with RVR and nonspecific ST abnormalities  - Trops negative x 2  - CTA chest negative for PE  - TSH normal  - HR still elevated - will switch from cardizem to Metoprolol 50 mg TID  - Continue lovenox, switch to eliquis once HR better controlled   - f/u TTE  - Cardiology following     #Cough - improved   #Leukocytosis - resolved   - Suspect viral   - PCT negative  - RVP/COVID negative  - Leukocytosis resolved  - Will DC abx    #HTN  - Continue home med - lisinopril-HCTZ     #hx Breast CA  - Continue home med - anastrazole     #DVT ppx  - Lovenox q12 for now    Dispo: Pending above. Home once medically cleared. Anticipate another ~24 hours. Reviewed prior progress notes, labs and imaging.    Discussed with Dr Rivas, Dr Nielsen    Primary Diagnosis: New onset A Fib    76 yo female with PMH HTN, breast CA (s/p L mastectomy 2019, radiation), polymyalgia rheumatica presented to ED for cough, neck pain. Found to be in rapid A Fib.    #New onset atrial fibrillation with RVR  - Continue tele monitoring- overnight in fverhdcb-  - ECG: A Fib with RVR and nonspecific ST abnormalities  - Trops negative x 2  - CTA chest negative for PE  - TSH normal  - HR still elevated - change Metoprolol to 100mg BID  - Continue lovenox convert to eliquis once HR better controlled   - TTE reviewed with Dr Nielsen - Normal EF   - Cardiology following - Discussed with Dr Nielsen will adjust metoprolol as above and give Lasix 40mg IVP X 1- will hold off on stress test until HR better Controlled     #Cough - improved   - orthopnea- will give lasix 40mg X1 today as per cardio     #HTN  - Continue home med - lisinopril-HCTZ     #hx Breast CA  - Continue home med - anastrazole     #DVT ppx  - Lovenox q12 for now    Dispo: Pending above. Home once medically cleared. Anticipate another ~24 hours.

## 2022-11-29 NOTE — PROGRESS NOTE ADULT - ASSESSMENT
AFIB/CHF - patient with elevated HR and mild CHF. metoprolol increased and lasix given as per cardiology. will continue to monitor on telemetry. EST on hold at this time due to heart rate. in patient vs out patient stress testing as per cardiology  Left breat cancer - patient is clinically stable. recommend out patient follow up with surgeon/oncologist  Pneumonia - patient is respiratory stable. will continue antibiotics. patient advised to follow up with pulmonary and repeat a CT of the chest      Case discussed with PA

## 2022-11-30 ENCOUNTER — TRANSCRIPTION ENCOUNTER (OUTPATIENT)
Age: 77
End: 2022-11-30

## 2022-11-30 VITALS
DIASTOLIC BLOOD PRESSURE: 73 MMHG | SYSTOLIC BLOOD PRESSURE: 119 MMHG | OXYGEN SATURATION: 97 % | RESPIRATION RATE: 16 BRPM

## 2022-11-30 PROBLEM — I10 ESSENTIAL (PRIMARY) HYPERTENSION: Chronic | Status: ACTIVE | Noted: 2022-11-27

## 2022-11-30 PROBLEM — M35.3 POLYMYALGIA RHEUMATICA: Chronic | Status: ACTIVE | Noted: 2022-11-27

## 2022-11-30 LAB — DIGOXIN SERPL-MCNC: 1.6 NG/ML — SIGNIFICANT CHANGE UP (ref 0.8–2)

## 2022-11-30 PROCEDURE — 36415 COLL VENOUS BLD VENIPUNCTURE: CPT

## 2022-11-30 PROCEDURE — 99291 CRITICAL CARE FIRST HOUR: CPT

## 2022-11-30 PROCEDURE — 87637 SARSCOV2&INF A&B&RSV AMP PRB: CPT

## 2022-11-30 PROCEDURE — 83036 HEMOGLOBIN GLYCOSYLATED A1C: CPT

## 2022-11-30 PROCEDURE — 71045 X-RAY EXAM CHEST 1 VIEW: CPT

## 2022-11-30 PROCEDURE — 85025 COMPLETE CBC W/AUTO DIFF WBC: CPT

## 2022-11-30 PROCEDURE — 83880 ASSAY OF NATRIURETIC PEPTIDE: CPT

## 2022-11-30 PROCEDURE — 80162 ASSAY OF DIGOXIN TOTAL: CPT

## 2022-11-30 PROCEDURE — 93306 TTE W/DOPPLER COMPLETE: CPT

## 2022-11-30 PROCEDURE — 86803 HEPATITIS C AB TEST: CPT

## 2022-11-30 PROCEDURE — 96361 HYDRATE IV INFUSION ADD-ON: CPT

## 2022-11-30 PROCEDURE — 80061 LIPID PANEL: CPT

## 2022-11-30 PROCEDURE — 81001 URINALYSIS AUTO W/SCOPE: CPT

## 2022-11-30 PROCEDURE — 84443 ASSAY THYROID STIM HORMONE: CPT

## 2022-11-30 PROCEDURE — 80048 BASIC METABOLIC PNL TOTAL CA: CPT

## 2022-11-30 PROCEDURE — 84484 ASSAY OF TROPONIN QUANT: CPT

## 2022-11-30 PROCEDURE — 71275 CT ANGIOGRAPHY CHEST: CPT | Mod: MA

## 2022-11-30 PROCEDURE — 84145 PROCALCITONIN (PCT): CPT

## 2022-11-30 PROCEDURE — 93005 ELECTROCARDIOGRAM TRACING: CPT

## 2022-11-30 PROCEDURE — 85027 COMPLETE CBC AUTOMATED: CPT

## 2022-11-30 PROCEDURE — 99232 SBSQ HOSP IP/OBS MODERATE 35: CPT

## 2022-11-30 PROCEDURE — 80053 COMPREHEN METABOLIC PANEL: CPT

## 2022-11-30 PROCEDURE — 96365 THER/PROPH/DIAG IV INF INIT: CPT

## 2022-11-30 PROCEDURE — 96375 TX/PRO/DX INJ NEW DRUG ADDON: CPT

## 2022-11-30 RX ORDER — LEVOFLOXACIN 5 MG/ML
1 INJECTION, SOLUTION INTRAVENOUS
Qty: 4 | Refills: 0
Start: 2022-11-30 | End: 2022-12-03

## 2022-11-30 RX ORDER — LISINOPRIL 2.5 MG/1
1 TABLET ORAL
Qty: 30 | Refills: 0
Start: 2022-11-30 | End: 2022-12-29

## 2022-11-30 RX ORDER — METOPROLOL TARTRATE 50 MG
1 TABLET ORAL
Qty: 60 | Refills: 0 | DISCHARGE
Start: 2022-11-30 | End: 2022-12-29

## 2022-11-30 RX ORDER — DIGOXIN 250 MCG
1 TABLET ORAL
Qty: 30 | Refills: 0
Start: 2022-11-30 | End: 2022-12-29

## 2022-11-30 RX ORDER — METOPROLOL TARTRATE 50 MG
1 TABLET ORAL
Qty: 60 | Refills: 0
Start: 2022-11-30 | End: 2022-12-29

## 2022-11-30 RX ORDER — LISINOPRIL/HYDROCHLOROTHIAZIDE 10-12.5 MG
1 TABLET ORAL
Qty: 0 | Refills: 0 | DISCHARGE

## 2022-11-30 RX ORDER — FUROSEMIDE 40 MG
1 TABLET ORAL
Qty: 30 | Refills: 0
Start: 2022-11-30 | End: 2022-12-29

## 2022-11-30 RX ORDER — FUROSEMIDE 40 MG
1 TABLET ORAL
Qty: 30 | Refills: 0 | DISCHARGE
Start: 2022-11-30 | End: 2022-12-29

## 2022-11-30 RX ORDER — APIXABAN 2.5 MG/1
1 TABLET, FILM COATED ORAL
Refills: 0 | DISCHARGE
Start: 2022-11-30

## 2022-11-30 RX ADMIN — Medication 250 MICROGRAM(S): at 05:38

## 2022-11-30 RX ADMIN — ANASTROZOLE 1 MILLIGRAM(S): 1 TABLET ORAL at 11:51

## 2022-11-30 RX ADMIN — LISINOPRIL 10 MILLIGRAM(S): 2.5 TABLET ORAL at 05:39

## 2022-11-30 RX ADMIN — APIXABAN 5 MILLIGRAM(S): 2.5 TABLET, FILM COATED ORAL at 05:38

## 2022-11-30 NOTE — PROGRESS NOTE ADULT - ASSESSMENT
Assessment:  Alma Lacey is a 77 year old obese woman with past medical history of Breast cancer (s/p radiation and mastectomy), Polymyalgia rheumatic and Hypertension presented with cough and neck pain, found to have atrial fibrillation with RVR.     ECG consistent with atrial fibrillation with RVR and nonspecific ST abnormalities, no prior ECG to compare to. Troponins negative x 2. CTPA negative for pulmonary embolism.     Echo consistent with normal LVEF 55-60%, normal RV size and function, severe biatrial enlargement, mild valvular disease, mild pulmonary hypertension, RAP ~ 15 mmHg.    Recommendations:  [] Atrial fibrillation with RVR: AF likely secondary to multiple risk factors predominantly hypertension and obesity. TSH normal. HR has improved s/p IV Digoxin loading, Digoxin level normal. Patient reports she has been ambulating and feels well. HR 70-80s BPM, continue Metoprolol tartrate 100 mg BID and Digoxin 250 mcg daily on discharge. Due to elevated [id number] score agree with anticoagulation for stroke prophylaxis, now receiving Eliquis.  Discussed option of WAGNER/DCCV with patient but she prefers medical management at this time, can re-evaluate as outpatient. Likely plan for outpatient ischemic evaluation, no signs of an acute coronary syndrome at this time   [] Mild CHF, possible HFpEF: Likely secondary to AF, s/p IV lasix with improvement in symptoms, continue Lasix 40 mg PO daily on discharge. AF treatment as per above.     The patient may be discharged home from cardiac standpoint, recommend close outpatient follow up in cardiology office next week; will need to re-address further treatment options for AF.    Kelin Nielsen MD  Cardiology     Assessment:  Alma Lacey is a 77 year old obese woman with past medical history of Breast cancer (s/p radiation and mastectomy), Polymyalgia rheumatic and Hypertension presented with cough and neck pain, found to have atrial fibrillation with RVR/Flutter.      ECG consistent with atrial fibrillation with RVR and nonspecific ST abnormalities, no prior ECG to compare to. Troponins negative x 2. CTPA negative for pulmonary embolism.     Echo consistent with normal LVEF 55-60%, normal RV size and function, severe biatrial enlargement, mild valvular disease, mild pulmonary hypertension, RAP ~ 15 mmHg.    Recommendations:  [] Atrial fibrillation with RVR/Flutter: AF likely secondary to multiple risk factors predominantly hypertension and obesity. TSH normal. HR has improved s/p IV Digoxin loading, Digoxin level normal. Patient reports she has been ambulating and feels well. HR 70-80s BPM, continue Metoprolol tartrate 100 mg BID and Digoxin 250 mcg daily on discharge. Due to elevated [id number] score agree with anticoagulation for stroke prophylaxis, now receiving Eliquis.  Discussed option of WAGNER/DCCV with patient but she prefers medical management at this time, can re-evaluate as outpatient. Likely plan for outpatient ischemic evaluation, no signs of an acute coronary syndrome at this time   [] Mild CHF, possible HFpEF: Likely secondary to AF, s/p IV lasix with improvement in symptoms, continue Lasix 40 mg PO daily on discharge. AF treatment as per above.     The patient may be discharged home from cardiac standpoint, recommend close outpatient follow up in cardiology office next week; will need to re-address further treatment options for AF.    Kelin Nielsen MD  Cardiology

## 2022-11-30 NOTE — DISCHARGE NOTE PROVIDER - NSDCMRMEDTOKEN_GEN_ALL_CORE_FT
anastrozole 1 mg oral tablet: 1 tab(s) orally once a day  apixaban 5 mg oral tablet: 1 tab(s) orally every 12 hours  digoxin 250 mcg (0.25 mg) oral tablet: 1 tab(s) orally once a day  Lasix 40 mg oral tablet: 1 tab(s) orally once a day   levoFLOXacin 500 mg oral tablet: 1 tab(s) orally every 24 hours  lisinopril-hydrochlorothiazide 10 mg-12.5 mg oral tablet: 1 tab(s) orally once a day  metoprolol tartrate 100 mg oral tablet: 1 tab(s) orally 2 times a day   anastrozole 1 mg oral tablet: 1 tab(s) orally once a day  apixaban 5 mg oral tablet: 1 tab(s) orally every 12 hours  digoxin 250 mcg (0.25 mg) oral tablet: 1 tab(s) orally once a day  Lasix 40 mg oral tablet: 1 tab(s) orally once a day   levoFLOXacin 500 mg oral tablet: 1 tab(s) orally every 24 hours  lisinopril 10 mg oral tablet: 1 tab(s) orally once a day   metoprolol tartrate 100 mg oral tablet: 1 tab(s) orally 2 times a day

## 2022-11-30 NOTE — DISCHARGE NOTE NURSING/CASE MANAGEMENT/SOCIAL WORK - PATIENT PORTAL LINK FT
You can access the FollowMyHealth Patient Portal offered by [facility] by registering at the following website: [email or web address]. By joining [facility]’s FollowMyHealth portal, you will also be able to view your health information using other applications (apps) compatible with our system.

## 2022-11-30 NOTE — DISCHARGE NOTE PROVIDER - NSDCCPCAREPLAN_GEN_ALL_CORE_FT
PRINCIPAL DISCHARGE DIAGNOSIS  Diagnosis: Rapid atrial fibrillation  Assessment and Plan of Treatment: -you were evaluated by Cardiology and started on new medications to control your heart rate and also an anticoagulant for prevention of stroke and blood clots  -continue LASIX 40 mg. daily per Cardiology recommendation   -you will need to follow up with Cardiology for possible Stress test if indicated, make an appointment for next week  -follow up with your PMD, Dr. Blank in 1 week      SECONDARY DISCHARGE DIAGNOSES  Diagnosis: PNA (pneumonia)  Assessment and Plan of Treatment:   -complete full course of antibiotics, as prescribed  -follow up for repeat CAT scan of your chest in 4-6 weeks

## 2022-11-30 NOTE — PROGRESS NOTE ADULT - SUBJECTIVE AND OBJECTIVE BOX
AUGUSTINE SNELL  77y  Female      patient with slight cough/no complaints of neck pain/SOB/chest pain      REVIEW OF SYSTEMS:    Cardiac HTN/AFIB  Pulmonary cough/lung infiltrate  GI no n/v/d/pain   left breast cancer  Neuro no headache/dizziness/numbness  Musculoskeletal PMR    FAMILY HISTORY:  FH: pancreatic cancer (Father)      T(C): 36.7 (22 @ 08:38), Max: 36.9 (22 @ 05:17)  HR: 110 (22 @ 08:38) (94 - 136)  BP: 137/85 (22 @ 08:38) (117/85 - 142/60)  RR: 17 (22 @ 08:38) (17 - 19)  SpO2: 97% (22 @ 08:38) (95% - 100%)  Wt(kg): --Vital Signs Last 24 Hrs  T(C): 36.7 (2022 08:38), Max: 36.9 (2022 05:17)  T(F): 98 (2022 08:38), Max: 98.4 (2022 05:17)  HR: 110 (2022 08:38) (94 - 136)  BP: 137/85 (2022 08:38) (117/85 - 142/60)  BP(mean): --  RR: 17 (2022 08:38) (17 - 19)  SpO2: 97% (2022 08:38) (95% - 100%)    Parameters below as of 2022 05:17  Patient On (Oxygen Delivery Method): room air      NSAIDs (Anaphylaxis)      PHYSICAL EXAM:    General resting comfortably  Neck no JVD thyroid stable non tender   Heart regular  Lungs clear  Abdomen non tender non distended normal bowel sounds no HSM/CVAT  Extremities no edema +pulses  Neurologic alert and oriented x 3 no acute focal changes        Consultant(s) Notes Reviewed:  [x ] YES  [ ] NO      LABS:  CBC Full  -  ( 2022 06:18 )  WBC Count : 9.73 K/uL  RBC Count : 4.88 M/uL  Hemoglobin : 14.9 g/dL  Hematocrit : 44.9 %  Platelet Count - Automated : 243 K/uL  Mean Cell Volume : 92.0 fl  Mean Cell Hemoglobin : 30.5 pg  Mean Cell Hemoglobin Concentration : 33.2 gm/dL  Auto Neutrophil # : x  Auto Lymphocyte # : x  Auto Monocyte # : x  Auto Eosinophil # : x  Auto Basophil # : x  Auto Neutrophil % : x  Auto Lymphocyte % : x  Auto Monocyte % : x  Auto Eosinophil % : x  Auto Basophil % : x                          14.9   9.73  )-----------( 243      ( 2022 06:18 )             44.9         141  |  106  |  12  ----------------------------<  109<H>  4.5   |  24  |  0.79    Ca    9.4      2022 06:18    TPro  7.6  /  Alb  3.3  /  TBili  0.5  /  DBili  x   /  AST  22  /  ALT  18  /  AlkPhos  69      LIVER FUNCTIONS - ( 2022 13:50 )  Alb: 3.3 g/dL / Pro: 7.6 g/dL / ALK PHOS: 69 U/L / ALT: 18 U/L / AST: 22 U/L / GGT: x                   Troponin I, High Sensitivity (. @ 13:50)   Troponin I, High Sensitivity Result: 14.3          Troponin I, High Sensitivity (11..22 @ 18:10)   Troponin I, High Sensitivity Result: 17.8              RADIOLOGY & ADDITIONAL TESTS:          < from: 12 Lead ECG (22 @ 13:21) >    Ventricular Rate 141 BPM    Atrial Rate 174 BPM    QRS Duration 98 ms    Q-T Interval 292 ms    QTC Calculation(Bazett) 447 ms    R Axis 1 degrees    T Axis -25 degrees    Diagnosis Line Atrial fibrillation with rapid ventricular response  AbnormalECG  No previous ECGs available  Confirmed by Tavo Nielsen ([id number]) on 2022 8:03:48 AM    < from: 12 Lead ECG (22 @ 14:27) >    Ventricular Rate 93 BPM    Atrial Rate 117 BPM    QRS Duration 94 ms    Q-T Interval 350 ms    QTC Calculation(Bazett) 435 ms    R Axis 33 degrees    T Axis -20 degrees    Diagnosis Line Atrial fibrillation  Abnormal ECG  When compared with ECG of 2022 13:21,  Vent. rate has decreased BY  48 BPM    Confirmed by Tavo Nielsen ([id number]) on 2022 8:03:34 AM    < end of copied text >  < end of copied text >        < from: 12 Lead ECG (22 @ 05:30) >    Ventricular Rate 108 BPM    Atrial Rate 107 BPM    QRS Duration 94 ms    Q-T Interval 298 ms    QTC Calculation(Bazett) 399 ms    R Axis 96 degrees    T Axis -30 degrees    Diagnosis Line Atrial fibrillation with rapid ventricular response  Nonspecific ST and T wave abnormality  Abnormal ECG  When compared with ECG of 2022 14:27,  No significant change was found  Confirmed by Tavo Nielsen ([id number]) on 2022 8:04:24 AM    < end of copied text >          < from: TTE Echo Complete w/o Contrast w/ Doppler (22 @ 07:57) >    ACC: [id number] EXAM:  ECHO TTE WO CON COMP W DOPP                          PROCEDURE DATE:  2022          INTERPRETATION:  TRANSTHORACIC ECHOCARDIOGRAM REPORT        Patient Name:   AUGUSTINE SNELL Patient Location: [address] Rec #:  [id number]        Accession #:      [id number]  Account #:      [id number]         Height:           64.2 in 163.0 cm  YOB: 1945        Weight:           205.0 lb 93.00 kg  Patient Age:    77 years        BSA:              1.98 m²  Patient Gender: F               BP:               123/97 mmHg      Date of Exam:        2022 7:57:24 AM  Sonographer:         Dutch Cuevas  Referring Physician: WILLIAM DAUGHERTY    Procedure:     2D Echo/Doppler/Color Doppler Complete.  Indications:   Abnormal electrocardiogram [ECG] [EKG] - R94.31  Diagnosis:     Mitral Regurgitation - I34.0  Study Details: Technically adequate study. Study quality was adversely   affected                 due to arrhythmia.        2D AND M-MODE MEASUREMENTS (normal ranges within parentheses):  Left                 Normal   Aorta/Left            Normal  Ventricle:                    Atrium:  IVSd (2D):    0.92  (0.7-1.1) Aortic Root   2.83  (2.4-3.7)                 cm             (2D):          cm  LVPWd (2D):   0.90  (0.7-1.1) Aortic Root   2.57  (2.4-3.7)                 cm             (Mmode):       cm  LVIDd (2D):   4.50  (3.4-5.7) AoV Cusp      1.56  (1.5-2.6)                 cm             Separation:    cm  LVIDs (2D):   2.86            Left Atrium   4.52 (1.9-4.0)                 cm             (2D):          cm  LV FS (2D):   36.4   (>25%)   Left Atrium   4.86  (1.9-4.0)                  %             (Mmode):       cm  LV EF (2D):   66 %   (>55%)   LA Volume     54.8  Relative Wall 0.40   (<0.42)Index        ml/m²  Thickness                     Right Ventricle:                                TAPSE:           1.56 cm    LV SYSTOLIC FUNCTION BY 2D PLANIMETRY (MOD):  EF-A4C View: 66.8 % EF-A2C View: 67.2 % EF-Biplane: 67 %    LV DIASTOLIC FUNCTION:  MV Peak E: 1.22 m/s Decel Time: 148 msec  MV Peak A: 0.31 m/s  E/A Ratio: 3.95    SPECTRAL DOPPLER ANALYSIS (where applicable):  Mitral Valve:  MV P1/2 Time: 42.78 msec  MV Area, PHT: 5.14 cm²    Aortic Valve: AoV Max Brayan: 1.12 m/s AoV Peak P.1 mmHg AoV Mean P.9 mmHg    LVOT Vmax: 0.80 m/s LVOT VTI: 0.158 m LVOT Diameter: 1.97 cm    AoV Area, Vmax: 2.16 cm² AoV Area, VTI: 2.35 cm² AoV Area, Vmn: 1.81 cm²  Ao VTI: 0.205  Tricuspid Valve and PA/RV Systolic Pressure: TR Max Velocity: 2.57 m/s RA   Pressure: 15 mmHg RVSP/PASP: 41.4 mmHg      PHYSICIAN INTERPRETATION:  Left Ventricle: The left ventricular internal cavity size is normal. Left   ventricular wall thickness is normal.  Global LV systolic function was normal. Left ventricular ejection   fraction, by visual estimation, is 55 to 60%. The mitral in-flow pattern   reveals no discernable A-wave, therefore no comment on diastolic function   can be made.  Right Ventricle: Normal right ventricular size and function.  Left Atrium: Severely enlarged left atrium.  Right Atrium: Severely enlarged right atrium.  Pericardium: There is no evidence of pericardial effusion.  Mitral Valve: Mild thickening and calcification of the anterior and   posterior mitral valve leaflets. Mild mitral valve regurgitation is seen.   Moderate posterior mitral annular calcification.  Tricuspid Valve: The tricuspid valve is normal in structure. Mild   tricuspid regurgitation is visualized. Estimated pulmonary artery   systolic pressure is 41.4mmHg assuming a right atrial pressure of 15   mmHg, which is consistent with mild pulmonary hypertension.  Aortic Valve: The aortic valve is trileaflet. Mild aortic valve leaflet   calcification. No aortic valve stenosis.  Pulmonic Valve: The pulmonic valve is normal.  Aorta: Aortic root measured at Sinus of Valsalva is normal.  Pulmonary Artery: The main pulmonary artery is normal in size.  Venous: The inferior vena cava is abnormal. The inferior vena cava was   dilated, with respiratory size variation less than 50%, consistent with   elevated right atrial pressure.      Summary:   1. The heart rate is tachycardic throughout the study.   2. Normal global left ventricular systolic function.   3. Left ventricular ejection fraction, by visual estimation, is 55 to   60%.   4. Normal left ventricular internal cavity size.   5. The mitral in-flow pattern reveals no discernable A-wave, therefore   no comment on diastolic function can be made.   6. Normal right ventricular size and function.   7. Severely enlarged left atrium.   8. Severely enlarged right atrium.   9. Mild thickening and calcification of the anterior and posterior   mitral valve leaflets.  10. Moderate posterior mitral annular calcification.  11. Mild mitral valve regurgitation.  12. Mild tricuspid regurgitation.  13. Mild aortic valve leaflet calcification. No aortic valve stenosis.  14. Estimated pulmonary artery systolic pressure is 41.4 mmHg assuming a   right atrial pressure of 15 mmHg, which is consistent with mildpulmonary   hypertension.  15. There is no evidence of pericardial effusion.    [id number] [facility] Gia SANTOS Electronically signed on   2022 at 11:59:53 AM        *** Final ***    < end of copied text >              < from: Xray Chest 1 View AP/PA (22 @ 14:16) >  ACC: [id number] EXAM:  XR CHEST AP OR PA 1V                          PROCEDURE DATE:  2022          INTERPRETATION:  AP chest on 2022 at 2:06 PM. Patient has   cough.    There is a history of left mastectomy for cancer over 10 years ago.    COMPARISON: None available.    Heart magnified by technique. Calcified mitral area noted.    Left mastectomy and clips in the left axilla noted.    Lungs are clear.    IMPRESSION: Clear lungs. Other findings as above.    --- End of Report ---      DEBRA YOST MD; Attending Radiologist  This document has been electronically signed. 2022  1:06PM    < end of copied text >            < from: CT Angio Chest PE Protocol w/ IV Cont (22 @ 19:07) >  ACC: [id number] EXAM:  CT ANGIO CHEST PULM ART WAWI                          PROCEDURE DATE:  2022          INTERPRETATION:  CLINICAL INFORMATION: Rule out PE    COMPARISON: None.    CONTRAST/COMPLICATIONS:  IV Contrast: Omnipaque 350  75 cc administered   25 cc discarded  Oral Contrast: NONE  Complications: None reported at time of study completion    PROCEDURE:  CT Angiography of the Chest.  Sagittal and coronal reformats were performed as well as 3D (MIP)   reconstructions.    FINDINGS:    LUNGS AND AIRWAYS: Patent central airways.  Peripheral left lower lobe   opacity likely representing subsegmental atelectasis, differential   including infectious or inflammatory etiology. Additional areas of   scattered linear and/or subsegmentalatelectasis. Adjacent groundglass   nodules in the posterior right upper lobe measuring 0.4 cm. A 0.3 cm   nodule in the right major fissure consistent with intrapulmonary lymph   node. Patchy nodule in the left upper lobe measuring 0.7 cm.  PLEURA: No pleural effusion.  MEDIASTINUM AND DMITRIY: Subcentimeter mediastinal lymph nodes.  VESSELS: No evidence of acute pulmonary embolism. Atherosclerotic changes   of the aorta and coronary vasculature.  HEART: Biatrial enlargement. No pericardial effusion.  CHEST WALL AND LOWER NECK: Left axillary surgical clips. Prior left   mastectomy.  VISUALIZED UPPER ABDOMEN: Within normal limits.  BONES: Degenerative changes.    IMPRESSION:  No evidence of acute pulmonary embolism. Peripheral left lower lobe   opacity likely representing peripheral subsegmental atelectasis. Patchy   nodule in the left upper lobe groundglass nodules in the posterior right   upper lobe may represent infectious or inflammatory etiology, recommend   follow-up CT in 4-6 weeks.    No segmental consolidations.        --- End of Report ---            SOILA HERNÁNDEZ MD; Attending Radiologist  This document has been electronically signed. 2022  7:20PM    < end of copied text >          acetaminophen     Tablet .. 650 milliGRAM(s) Oral every 6 hours PRN  aluminum hydroxide/magnesium hydroxide/simethicone Suspension 30 milliLiter(s) Oral every 4 hours PRN  anastrozole 1 milliGRAM(s) Oral daily  enoxaparin Injectable 90 milliGRAM(s) SubCutaneous every 12 hours  hydrochlorothiazide 12.5 milliGRAM(s) Oral daily  lisinopril 10 milliGRAM(s) Oral daily  melatonin 3 milliGRAM(s) Oral at bedtime PRN  metoprolol tartrate 50 milliGRAM(s) Oral three times a day  ondansetron Injectable 4 milliGRAM(s) IV Push every 8 hours PRN              
  AUGUSTINE SNELL  77y  Female      Patient is a 77y old  Female who presents with a chief complaint of new onset A Fib, rapid a fib (2022 10:18)      REVIEW OF SYSTEMS:    Cardiac  Pulmonary  GI    Neuro    FAMILY HISTORY:  FH: pancreatic cancer (Father)      T(C): 36.5 (22 @ 09:04), Max: 36.9 (22 @ 19:34)  HR: 106 (22 @ 09:04) (82 - 112)  BP: 121/85 (22 @ 09:04) (107/77 - 122/72)  RR: 15 (22 @ 05:49) (15 - 16)  SpO2: 95% (22 @ 09:04) (95% - 98%)  Wt(kg): --Vital Signs Last 24 Hrs  T(C): 36.5 (2022 09:04), Max: 36.9 (2022 19:34)  T(F): 97.7 (2022 09:04), Max: 98.5 (2022 19:34)  HR: 106 (2022 09:04) (82 - 112)  BP: 121/85 (2022 09:04) (107/77 - 122/72)  BP(mean): --  RR: 15 (2022 05:49) (15 - 16)  SpO2: 95% (2022 09:04) (95% - 98%)    Parameters below as of 2022 09:04  Patient On (Oxygen Delivery Method): room air      NSAIDs (Anaphylaxis)      PHYSICAL EXAM:    General resting comfortably no respiratory distress  Neck thyroid stable  Heart irregular  Lungs occasional rales at bases  Abdomen non tender non distended normal bowel sounds no HSM/CVAT  Extremities non tender no edema +pulses  Neurologic alert  and oriented x 3        Consultant(s) Notes Reviewed:  [x ] YES  [ ] NO      LABS:  CBC Full  -  ( 2022 06:18 )  WBC Count : 9.73 K/uL  RBC Count : 4.88 M/uL  Hemoglobin : 14.9 g/dL  Hematocrit : 44.9 %  Platelet Count - Automated : 243 K/uL  Mean Cell Volume : 92.0 fl  Mean Cell Hemoglobin : 30.5 pg  Mean Cell Hemoglobin Concentration : 33.2 gm/dL  Auto Neutrophil # : x  Auto Lymphocyte # : x  Auto Monocyte # : x  Auto Eosinophil # : x  Auto Basophil # : x  Auto Neutrophil % : x  Auto Lymphocyte % : x  Auto Monocyte % : x  Auto Eosinophil % : x  Auto Basophil % : x                          14.9   9.73  )-----------( 243      ( 2022 06:18 )             44.9         141  |  106  |  12  ----------------------------<  109<H>  4.5   |  24  |  0.79    Ca    9.4      2022 06:18    TPro  7.6  /  Alb  3.3  /  TBili  0.5  /  DBili  x   /  AST  22  /  ALT  18  /  AlkPhos  69      LIVER FUNCTIONS - ( 2022 13:50 )  Alb: 3.3 g/dL / Pro: 7.6 g/dL / ALK PHOS: 69 U/L / ALT: 18 U/L / AST: 22 U/L / GGT: x             Urinalysis Basic - ( 2022 21:14 )    Color: Yellow / Appearance: Clear / S.010 / pH: x  Gluc: x / Ketone: Negative  / Bili: Negative / Urobili: Negative   Blood: x / Protein: Negative / Nitrite: Negative   Leuk Esterase: Trace / RBC: 0-4 /HPF / WBC 0-2 /HPF   Sq Epi: x / Non Sq Epi: Neg.-Few / Bacteria: Negative /HPF                  acetaminophen     Tablet .. 650 milliGRAM(s) Oral every 6 hours PRN  aluminum hydroxide/magnesium hydroxide/simethicone Suspension 30 milliLiter(s) Oral every 4 hours PRN  anastrozole 1 milliGRAM(s) Oral daily  enoxaparin Injectable 90 milliGRAM(s) SubCutaneous every 12 hours  hydrochlorothiazide 12.5 milliGRAM(s) Oral daily  levoFLOXacin  Tablet 500 milliGRAM(s) Oral every 24 hours  lisinopril 10 milliGRAM(s) Oral daily  melatonin 3 milliGRAM(s) Oral at bedtime PRN  metoprolol tartrate 100 milliGRAM(s) Oral two times a day  ondansetron Injectable 4 milliGRAM(s) IV Push every 8 hours PRN      HEALTH ISSUES - PROBLEM Dx:        
AUGUSTINE SNELL  [id number]      Chief Complaint: New onset atrial fibrillation/Mild CHF    Interval History: The patient denies palpitations, reports having symptoms of orthopnea.     Tele: atrial fibrillation/flutter 100s BPM      Current meds:   acetaminophen     Tablet .. 650 milliGRAM(s) Oral every 6 hours PRN  aluminum hydroxide/magnesium hydroxide/simethicone Suspension 30 milliLiter(s) Oral every 4 hours PRN  anastrozole 1 milliGRAM(s) Oral daily  enoxaparin Injectable 90 milliGRAM(s) SubCutaneous every 12 hours  hydrochlorothiazide 12.5 milliGRAM(s) Oral daily  levoFLOXacin  Tablet 500 milliGRAM(s) Oral every 24 hours  lisinopril 10 milliGRAM(s) Oral daily  melatonin 3 milliGRAM(s) Oral at bedtime PRN  metoprolol tartrate 100 milliGRAM(s) Oral two times a day  ondansetron Injectable 4 milliGRAM(s) IV Push every 8 hours PRN      Objective:     Vital Signs:   T(C): 36.5 (11-29-22 @ 09:04), Max: 36.9 (11-28-22 @ 19:34)  HR: 106 (11-29-22 @ 09:04) (82 - 112)  BP: 121/85 (11-29-22 @ 09:04) (107/77 - 122/72)  RR: 15 (11-29-22 @ 05:49) (15 - 16)  SpO2: 95% (11-29-22 @ 09:04) (95% - 98%)  Wt(kg): --    Physical Exam:   General: obese woman, no acute distress  HEENT: sclera anicteric  Neck: supple  CVS: JVP ~ 7 cm H20, irregularly irregular, s1, s2  Chest: unlabored respirations, clear to auscultation b/l  Abdomen: obese  Extremities: no lower extremity edema b/l  Neuro: awake, alert & oriented x 3  Psych: normal affect      Labs:   28 Nov 2022 06:18    141    |  106    |  12     ----------------------------<  109    4.5     |  24     |  0.79     Ca    9.4        28 Nov 2022 06:18    TPro  7.6    /  Alb  3.3    /  TBili  0.5    /  DBili  x      /  AST  22     /  ALT  18     /  AlkPhos  69     27 Nov 2022 13:50                          14.9   9.73  )-----------( 243      ( 28 Nov 2022 06:18 )             44.9           ECG (11/27/22): atrial fibrillation with RVR, nonspecific ST abnormalities (no prior ECG to compare to)     CTPA (11/27/22):  No evidence of acute pulmonary embolism. Peripheral left lower lobe   opacity likely representing peripheral subsegmental atelectasis. Patchy nodule in the left upper lobe groundglass nodules in the posterior right upper lobe may represent infectious or inflammatory etiology, recommend follow-up CT in 4-6 weeks.  No segmental consolidations.  TTE (11/28/22):   1. The heart rate is tachycardic throughout the study.   2. Normal global left ventricular systolic function.   3. Left ventricular ejection fraction, by visual estimation, is 55 to 60%.   4. Normal left ventricular internal cavity size.   5. The mitral in-flow pattern reveals no discernable A-wave, therefore no comment on diastolic function can be made.   6. Normal right ventricular size and function.   7. Severely enlarged left atrium.   8. Severely enlarged right atrium.   9. Mild thickening and calcification of the anterior and posterior   mitral valve leaflets.  10. Moderate posterior mitral annular calcification.  11. Mild mitral valve regurgitation.  12. Mild tricuspid regurgitation.  13. Mild aortic valve leaflet calcification. No aortic valve stenosis.  14. Estimated pulmonary artery systolic pressure is 41.4 mmHg assuming a right atrial pressure of 15 mmHg, which is consistent with mild pulmonary hypertension.  15. There is no evidence of pericardial effusion.      
AUGUSTINE SNELL  [id number]      Chief Complaint: New onset atrial fibrillation/Mild CHF    Interval History: The patient reports orthopnea has decreased. Denies chest pain, shortness of breath, palpitations or dizziness.     Tele: atrial fibrillation/flutter 70-80s BPM      Current meds:   acetaminophen     Tablet .. 650 milliGRAM(s) Oral every 6 hours PRN  aluminum hydroxide/magnesium hydroxide/simethicone Suspension 30 milliLiter(s) Oral every 4 hours PRN  anastrozole 1 milliGRAM(s) Oral daily  apixaban 5 milliGRAM(s) Oral every 12 hours  digoxin     Tablet 250 MICROGram(s) Oral daily  hydrochlorothiazide 12.5 milliGRAM(s) Oral daily  levoFLOXacin  Tablet 500 milliGRAM(s) Oral every 24 hours  lisinopril 10 milliGRAM(s) Oral daily  melatonin 3 milliGRAM(s) Oral at bedtime PRN  metoprolol tartrate 100 milliGRAM(s) Oral two times a day  ondansetron Injectable 4 milliGRAM(s) IV Push every 8 hours PRN      Objective:     Vital Signs:   T(C): 36.7 (11-30-22 @ 05:21), Max: 36.7 (11-30-22 @ 05:21)  HR: 81 (11-30-22 @ 05:21) (81 - 95)  BP: 115/66 (11-30-22 @ 05:21) (114/77 - 116/67)  RR: 16 (11-30-22 @ 05:21) (16 - 16)  SpO2: 96% (11-30-22 @ 05:21) (95% - 96%)  Wt(kg): --    Physical Exam:   General: obese woman, no acute distress  HEENT: sclera anicteric  Neck: supple  CVS: JVP ~ 7 cm H20, irregularly irregular, s1, s2  Chest: unlabored respirations, clear to auscultation b/l  Abdomen: obese  Extremities: no lower extremity edema b/l  Neuro: awake, alert & oriented x 3  Psych: normal affect    Labs:     Digoxin level 1.6       ECG (11/27/22): atrial fibrillation with RVR, nonspecific ST abnormalities (no prior ECG to compare to)     CTPA (11/27/22):  No evidence of acute pulmonary embolism. Peripheral left lower lobe   opacity likely representing peripheral subsegmental atelectasis. Patchy nodule in the left upper lobe groundglass nodules in the posterior right upper lobe may represent infectious or inflammatory etiology, recommend follow-up CT in 4-6 weeks.  No segmental consolidations.    TTE (11/28/22):   1. The heart rate is tachycardic throughout the study.   2. Normal global left ventricular systolic function.   3. Left ventricular ejection fraction, by visual estimation, is 55 to 60%.   4. Normal left ventricular internal cavity size.   5. The mitral in-flow pattern reveals no discernable A-wave, therefore no comment on diastolic function can be made.   6. Normal right ventricular size and function.   7. Severely enlarged left atrium.   8. Severely enlarged right atrium.   9. Mild thickening and calcification of the anterior and posterior   mitral valve leaflets.  10. Moderate posterior mitral annular calcification.  11. Mild mitral valve regurgitation.  12. Mild tricuspid regurgitation.  13. Mild aortic valve leaflet calcification. No aortic valve stenosis.  14. Estimated pulmonary artery systolic pressure is 41.4 mmHg assuming a right atrial pressure of 15 mmHg, which is consistent with mild pulmonary hypertension.  15. There is no evidence of pericardial effusion.

## 2022-11-30 NOTE — CHART NOTE - NSCHARTNOTEFT_GEN_A_CORE
Reviewed prior progress notes, labs and imaging.    Discussed with Dr Rivas, Dr Nielsen    Primary Diagnosis: New onset A Fib    76 yo female with PMH HTN, breast CA (s/p L mastectomy 2019, radiation), polymyalgia rheumatica presented to ED for cough, neck pain. Found to be in rapid A Fib.    #New onset atrial fibrillation with RVR  - ECG: A Fib with RVR and nonspecific ST abnormalities  - Trops negative x 2  - CTA chest negative for PE  - TSH normal  - HR change Metoprolol to 100mg BID  - Eliquis for D/C   - TTE reviewed with Dr Nielsen - Normal EF   - Cardiology following - per Dr Nielsen will hold off on stress test and ok to D/C HOME TODAY, F/U WITH CARDIOLOGY AS OUTPT       #HTN  - Continue home med - lisinopril-HCTZ     #hx Breast CA  - Continue home med - anastrazole     #DVT ppx  - Lovenox q12 for now, change to eliquis for home    Dispo:  D/C HOME TODAY

## 2022-11-30 NOTE — DISCHARGE NOTE PROVIDER - DISCHARGE SERVICE FOR PATIENT
supervision on the discharge service for the patient. I have reviewed and made amendments to the documentation where necessary.

## 2022-11-30 NOTE — DISCHARGE NOTE PROVIDER - CARE PROVIDER_API CALL
Kenneth Rivas (DO)  Internal Medicine  [address] [facility], [address]  [place], [place] [id number]  Phone: [phone]  Fax: [phone]  Follow Up Time:

## 2022-11-30 NOTE — DISCHARGE NOTE PROVIDER - HOSPITAL COURSE
Hospital Course    78 yo female with PMH HTN, breast CA (s/p L mastectomy 2019, radiation), polymyalgia rheumatica presented to ED for cough, neck pain. Found to be in rapid A Fib on admission, which is new onset.   ECG: A Fib with RVR and nonspecific ST abnormalities, Trops negative x 2,  CTA chest negative for PE, TSH normal.  Pt started on a BB for heart rate control and Eliquis for AC.  She was evaluated by Cardiology, can do ischemic workup as an outpatient.  Echo done, pt with normal EF.      < from: TTE Echo Complete w/o Contrast w/ Doppler (11.28.22 @ 07:57) >      Summary:   1. The heart rate is tachycardic throughout the study.   2. Normal global left ventricular systolic function.   3. Left ventricular ejection fraction, by visual estimation, is 55 to   60%.   4. Normal left ventricular internal cavity size.   5. The mitral in-flow pattern reveals no discernable A-wave, therefore   no comment on diastolic function can be made.   6. Normal right ventricular size and function.   7. Severely enlarged left atrium.   8. Severely enlarged right atrium.   9. Mild thickening and calcification of the anterior and posterior   mitral valve leaflets.  10. Moderate posterior mitral annular calcification.  11. Mild mitral valve regurgitation.  12. Mild tricuspid regurgitation.  13. Mild aortic valve leaflet calcification. No aortic valve stenosis.  14. Estimated pulmonary artery systolic pressure is 41.4 mmHg assuming a   right atrial pressure of 15 mmHg, which is consistent with mildpulmonary   hypertension.  15. There is no evidence of pericardial effusion.      < end of copied text >    Source of Infection:  NA  Antibiotic / Last Day:  NA      Discharging Provider:  NAZIA Lynne  Contact Info: Cell [phone] - Please call with any questions or concerns.    Outpatient Provider:  Dr. Rivas

## 2022-11-30 NOTE — DISCHARGE NOTE PROVIDER - NSDCFUSCHEDAPPT_GEN_ALL_CORE_FT
Francisco Todd  [facility] Physician [facility]  CARDIOLOGY [address]  Scheduled Appointment: 12/08/2022

## 2022-12-04 ENCOUNTER — NON-APPOINTMENT (OUTPATIENT)
Age: 77
End: 2022-12-04

## 2022-12-07 ENCOUNTER — APPOINTMENT (OUTPATIENT)
Dept: PULMONOLOGY | Facility: CLINIC | Age: 77
End: 2022-12-07

## 2022-12-07 VITALS
SYSTOLIC BLOOD PRESSURE: 122 MMHG | HEIGHT: 64 IN | TEMPERATURE: 97.5 F | BODY MASS INDEX: 33.46 KG/M2 | HEART RATE: 56 BPM | WEIGHT: 196 LBS | OXYGEN SATURATION: 97 % | DIASTOLIC BLOOD PRESSURE: 75 MMHG

## 2022-12-07 DIAGNOSIS — R06.83 SNORING: ICD-10-CM

## 2022-12-07 DIAGNOSIS — Z86.79 PERSONAL HISTORY OF OTHER DISEASES OF THE CIRCULATORY SYSTEM: ICD-10-CM

## 2022-12-07 DIAGNOSIS — Z85.3 PERSONAL HISTORY OF MALIGNANT NEOPLASM OF BREAST: ICD-10-CM

## 2022-12-07 DIAGNOSIS — J18.9 PNEUMONIA, UNSPECIFIED ORGANISM: ICD-10-CM

## 2022-12-07 DIAGNOSIS — R05.9 COUGH, UNSPECIFIED: ICD-10-CM

## 2022-12-07 PROCEDURE — 99204 OFFICE O/P NEW MOD 45 MIN: CPT

## 2022-12-07 NOTE — REVIEW OF SYSTEMS
[Fatigue] : fatigue [Recent Wt Loss (___ Lbs)] : ~T recent [unfilled] lb weight loss [Dry Mouth] : dry mouth [Cough] : cough [Obesity] : obesity [Fever] : no fever [Dry Eyes] : no dry eyes [Sore Throat] : no sore throat [Hemoptysis] : no hemoptysis [Sputum] : no sputum [Dyspnea] : no dyspnea [Wheezing] : no wheezing [GERD] : no gerd [Abdominal Pain] : no abdominal pain [Nausea] : no nausea [Vomiting] : no vomiting [Dysphagia] : no dysphagia [Bleeding] : no bleeding [Myalgias] : no myalgias [Chronic Pain] : no chronic pain [Rash] : no rash [Blood Transfusion] : no blood transfusion [Clotting Disorder/ Frequent bleeding] : no clotting disorder/ frequent bleeding [Diabetes] : no diabetes [Thyroid Problem] : no thyroid problem [TextBox_3] : 9 pounds  in hospital   [TextBox_30] : nocturnal cough   now improved  [TextBox_94] : am stiffness  at times

## 2022-12-07 NOTE — HISTORY OF PRESENT ILLNESS
[Never] : never [Awakes Unrefreshed] : awakes unrefreshed [Fatigue] : fatigue [Snoring] : snoring [TextBox_4] : 12/7/2022\par \par Hospital Course:\par Discharge Date	30-Nov-2022\par Admission Date	27-Nov-2022 16:37\par Reason for Admission	new onset A Fib, rapid a fib\par Hospital Course	\par Hospital Course\par  \par 78 yo female with PMH HTN, breast CA (s/p L mastectomy 2019, radiation),\par polymyalgia rheumatica presented to ED for cough, neck pain. Found to be in\par rapid A Fib on admission, which is new onset.   ECG: A Fib with RVR and\par nonspecific ST abnormalities, Trops negative x 2,  CTA chest negative for PE,\par TSH normal.  Pt started on a BB for heart rate control and Eliquis for AC.  She\par was evaluated by Cardiology, can do ischemic workup as an outpatient.  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

## 2022-12-07 NOTE — PHYSICAL EXAM
[IV] : Mallampati Class: IV [Supple] : supple [No Neck Mass] : no neck mass [No JVD] : no jvd [Normal S1, S2] : normal s1, s2 [Clear to Auscultation Bilaterally] : clear to auscultation bilaterally [Kyphosis] : kyphosis [Benign] : benign [No Masses] : no masses [Soft] : soft [Normal Bowel Sounds] : normal bowel sounds [Normal Gait] : normal gait [No Clubbing] : no clubbing [No Edema] : no edema [No Rash] : no rash [No Motor Deficits] : no motor deficits [Normal Affect] : normal affect [TextBox_2] : plesant f no distress speaking full sentences  no cough noted  [TextBox_11] : crowded airway no lesions

## 2022-12-07 NOTE — ASSESSMENT
[FreeTextEntry1] : 76y/o   female never smoker\par \par 1-  mild cough -  OOH    afib/ bronchitis/pneumonia ct 11/27/22  with nodules JAMES 7 mm  and RUL  4mm \par 2-h/o breast cancer   lumpectomy /RT then mastectomy left \par 3- afib  on eliquis  -  ECHO  normal RV   enlarged  RA/LA  ef 55-60 % mild pulm  HTN \par 4- obesity  BMI  33    / sleep disorder ?\par 5- vaccinations   covid + booster      primary \par \par \par Recommendations \par 1- PFT\par 2- repeat ct\par 3- sleep study\par 4-  exercise weight loss\par \par return after PFT/CT patient and  agree

## 2022-12-07 NOTE — REASON FOR VISIT
[Follow-Up - From Hospitalization] : a follow-up visit after a recent hospitalization [Pneumonia] : pneumonia [Cough] : cough [Spouse] : spouse [Abnormal CXR/ Chest CT] : an abnormal CXR/ chest CT

## 2022-12-08 ENCOUNTER — NON-APPOINTMENT (OUTPATIENT)
Age: 77
End: 2022-12-08

## 2022-12-08 ENCOUNTER — APPOINTMENT (OUTPATIENT)
Dept: CARDIOLOGY | Facility: CLINIC | Age: 77
End: 2022-12-08

## 2022-12-08 VITALS
OXYGEN SATURATION: 97 % | SYSTOLIC BLOOD PRESSURE: 103 MMHG | DIASTOLIC BLOOD PRESSURE: 69 MMHG | RESPIRATION RATE: 16 BRPM | HEART RATE: 76 BPM | HEIGHT: 64 IN | BODY MASS INDEX: 34.83 KG/M2 | TEMPERATURE: 97.5 F | WEIGHT: 204 LBS

## 2022-12-08 DIAGNOSIS — Z00.00 ENCOUNTER FOR GENERAL ADULT MEDICAL EXAMINATION W/OUT ABNORMAL FINDINGS: ICD-10-CM

## 2022-12-08 PROCEDURE — 93000 ELECTROCARDIOGRAM COMPLETE: CPT

## 2022-12-08 PROCEDURE — 99214 OFFICE O/P EST MOD 30 MIN: CPT

## 2022-12-08 RX ORDER — LISINOPRIL AND HYDROCHLOROTHIAZIDE TABLETS 10; 12.5 MG/1; MG/1
10-12.5 TABLET ORAL
Qty: 90 | Refills: 0 | Status: DISCONTINUED | COMMUNITY
Start: 2022-10-07

## 2022-12-08 NOTE — CARDIOLOGY SUMMARY
[de-identified] : December 8, 2022 AF moderate rate and is TWC [de-identified] : November 2022 normal LV systolic function Marked biatrial enlargement

## 2022-12-08 NOTE — PHYSICAL EXAM
[No Edema] : no edema [Normal] : moves all extremities, no focal deficits, normal speech [de-identified] : Irregular no murmur

## 2022-12-08 NOTE — DISCUSSION/SUMMARY
[Patient] : the patient [Risks] : risks [Benefits] : benefits [Alternatives] : alternatives [___ Month(s)] : in [unfilled] month(s) [FreeTextEntry1] : Reviewed and discussed with patient and  in detail reviewed studies available.  Recommended continuing her current medications including Eliquis I think nuclear stress study EP consultation regarding possible WAGNER guided cardioversion or ablation.  Follow-up in office thereafter.

## 2022-12-08 NOTE — ASSESSMENT
[FreeTextEntry1] : Atrial fibrillation age-indeterminate possibly recent onset rate now controlled and asymptomatic.\par Reported biatrial enlargement

## 2022-12-08 NOTE — REASON FOR VISIT
[Other: ____] : [unfilled] [Spouse] : spouse [FreeTextEntry3] : Rolo Ruiz [FreeTextEntry1] : Reviewed discharge summary patient had been coughing was evaluated and eventually found to have A. fib with rapid ventricular response possible pneumonitis.  Rate was controlled.  Echo showed enlarged atria preserved LV systolic function.  She was placed on oral anticoagulants.\par \par She feels well at this point.  She recalls having had COVID-vaccine a month or 2 prior to this episode.\par \par She has no palpitations chest pain or shortness of breath nor edema on her current regimen.  She is treated for hypertension.  Has prior breast surgery for carcinoma.  No history of CAD.

## 2022-12-13 ENCOUNTER — OUTPATIENT (OUTPATIENT)
Dept: OUTPATIENT SERVICES | Facility: HOSPITAL | Age: 77
LOS: 1 days | End: 2022-12-13
Payer: MEDICARE

## 2022-12-13 DIAGNOSIS — G47.33 OBSTRUCTIVE SLEEP APNEA (ADULT) (PEDIATRIC): ICD-10-CM

## 2022-12-13 DIAGNOSIS — Z90.12 ACQUIRED ABSENCE OF LEFT BREAST AND NIPPLE: Chronic | ICD-10-CM

## 2022-12-13 DIAGNOSIS — Z96.659 PRESENCE OF UNSPECIFIED ARTIFICIAL KNEE JOINT: Chronic | ICD-10-CM

## 2022-12-13 PROCEDURE — 95806 SLEEP STUDY UNATT&RESP EFFT: CPT | Mod: 26

## 2022-12-13 PROCEDURE — 95806 SLEEP STUDY UNATT&RESP EFFT: CPT

## 2022-12-20 ENCOUNTER — OUTPATIENT (OUTPATIENT)
Dept: OUTPATIENT SERVICES | Facility: HOSPITAL | Age: 77
LOS: 1 days | End: 2022-12-20
Payer: MEDICARE

## 2022-12-20 ENCOUNTER — NON-APPOINTMENT (OUTPATIENT)
Age: 77
End: 2022-12-20

## 2022-12-20 ENCOUNTER — APPOINTMENT (OUTPATIENT)
Dept: CV DIAGNOSTICS | Facility: HOSPITAL | Age: 77
End: 2022-12-20

## 2022-12-20 DIAGNOSIS — Z90.12 ACQUIRED ABSENCE OF LEFT BREAST AND NIPPLE: Chronic | ICD-10-CM

## 2022-12-20 DIAGNOSIS — Z96.659 PRESENCE OF UNSPECIFIED ARTIFICIAL KNEE JOINT: Chronic | ICD-10-CM

## 2022-12-20 DIAGNOSIS — I48.91 UNSPECIFIED ATRIAL FIBRILLATION: ICD-10-CM

## 2022-12-20 PROCEDURE — 78452 HT MUSCLE IMAGE SPECT MULT: CPT | Mod: MH

## 2022-12-20 PROCEDURE — 93018 CV STRESS TEST I&R ONLY: CPT

## 2022-12-20 PROCEDURE — 93017 CV STRESS TEST TRACING ONLY: CPT

## 2022-12-20 PROCEDURE — 78452 HT MUSCLE IMAGE SPECT MULT: CPT | Mod: 26,MH

## 2022-12-20 PROCEDURE — 93016 CV STRESS TEST SUPVJ ONLY: CPT

## 2022-12-20 PROCEDURE — A9500: CPT

## 2022-12-21 ENCOUNTER — APPOINTMENT (OUTPATIENT)
Dept: ELECTROPHYSIOLOGY | Facility: CLINIC | Age: 77
End: 2022-12-21
Payer: MEDICARE

## 2022-12-21 ENCOUNTER — NON-APPOINTMENT (OUTPATIENT)
Age: 77
End: 2022-12-21

## 2022-12-21 VITALS
WEIGHT: 196 LBS | HEIGHT: 64 IN | SYSTOLIC BLOOD PRESSURE: 111 MMHG | OXYGEN SATURATION: 97 % | HEART RATE: 75 BPM | RESPIRATION RATE: 14 BRPM | DIASTOLIC BLOOD PRESSURE: 70 MMHG | BODY MASS INDEX: 33.46 KG/M2

## 2022-12-21 PROCEDURE — 99214 OFFICE O/P EST MOD 30 MIN: CPT

## 2022-12-21 PROCEDURE — 93000 ELECTROCARDIOGRAM COMPLETE: CPT

## 2022-12-21 RX ORDER — TURMERIC/TURMERIC EXT/PEPR EXT 900-100 MG
CAPSULE ORAL
Refills: 0 | Status: ACTIVE | COMMUNITY

## 2022-12-21 RX ORDER — ZINC SULFATE 50(220)MG
CAPSULE ORAL
Refills: 0 | Status: ACTIVE | COMMUNITY

## 2022-12-22 ENCOUNTER — APPOINTMENT (OUTPATIENT)
Dept: CARDIOLOGY | Facility: CLINIC | Age: 77
End: 2022-12-22

## 2022-12-26 ENCOUNTER — NON-APPOINTMENT (OUTPATIENT)
Age: 77
End: 2022-12-26

## 2022-12-27 ENCOUNTER — APPOINTMENT (OUTPATIENT)
Dept: CT IMAGING | Facility: CLINIC | Age: 77
End: 2022-12-27
Payer: MEDICARE

## 2022-12-27 ENCOUNTER — OUTPATIENT (OUTPATIENT)
Dept: OUTPATIENT SERVICES | Facility: HOSPITAL | Age: 77
LOS: 1 days | End: 2022-12-27
Payer: MEDICARE

## 2022-12-27 DIAGNOSIS — Z90.12 ACQUIRED ABSENCE OF LEFT BREAST AND NIPPLE: Chronic | ICD-10-CM

## 2022-12-27 DIAGNOSIS — Z96.659 PRESENCE OF UNSPECIFIED ARTIFICIAL KNEE JOINT: Chronic | ICD-10-CM

## 2022-12-27 DIAGNOSIS — I48.91 UNSPECIFIED ATRIAL FIBRILLATION: ICD-10-CM

## 2022-12-27 PROCEDURE — 75574 CT ANGIO HRT W/3D IMAGE: CPT | Mod: 26,MH

## 2022-12-27 PROCEDURE — 75574 CT ANGIO HRT W/3D IMAGE: CPT

## 2022-12-28 ENCOUNTER — APPOINTMENT (OUTPATIENT)
Dept: CT IMAGING | Facility: HOSPITAL | Age: 77
End: 2022-12-28

## 2022-12-28 ENCOUNTER — NON-APPOINTMENT (OUTPATIENT)
Age: 77
End: 2022-12-28

## 2022-12-30 ENCOUNTER — NON-APPOINTMENT (OUTPATIENT)
Age: 77
End: 2022-12-30

## 2022-12-31 ENCOUNTER — NON-APPOINTMENT (OUTPATIENT)
Age: 77
End: 2022-12-31

## 2023-01-03 NOTE — HISTORY OF PRESENT ILLNESS
[FreeTextEntry1] : I had the pleasure of seeing Alma Lacey today for consultation for AF management in the arrhythmia clinic at [facility]. As you well know, she is a pleasant 77 year old woman with a history of hypertension, breast cancer status post mastectomy, and recently diagnosed atrial fibrillation in the setting of pneumonia.  Patient reports she was in her usual state of health until mid November when she developed a cough.  She reports that the cough worsened and shortly after Thanksgiving had gone to seek evaluation for her cough.  She was found to be newly diagnosed atrial fibrillation with rapid ventricular rates and was also diagnosed with pneumonia and was given a course of antibiotics.  She was started on Eliquis as well as placed on AV reid blocking agents for rate control.  She she was discharged on digoxin 0.25 mg daily as well as metoprolol 100 mg twice daily.  She remains in persistent atrial fibrillation.  She denies any palpitations, chest pain, near-syncope, or syncope.  She reports some fatigue.  She also reports her last ECG was about 2 years ago which she states was normal.\par \par An echocardiogram performed in November 2022 demonstrated normal LV function as well as marked biatrial enlargement.\par

## 2023-01-03 NOTE — CARDIOLOGY SUMMARY
[de-identified] : 12/21/22: AF with VR 71 bpm [de-identified] : 12/20/2022: Medium sized, mild to moderate defects in the distal anterior, distal anteroseptal, and apical walls that are mostly fixed suggestive of infarct with minimal margot-infarct ischemia; medium sized, mild to moderate defect in the basal to mid inferior, basal to mid inferoseptal, and distal inferolateral walls that are fixed suggestive of infarct; LVEF 57%

## 2023-01-03 NOTE — DISCUSSION/SUMMARY
[EKG obtained to assist in diagnosis and management of assessed problem(s)] : EKG obtained to assist in diagnosis and management of assessed problem(s) [FreeTextEntry1] : In summary, Ms. Lacey is a 77 year old woman with a history of hypertension, breast cancer status post mastectomy, biatrial enlargement, and recently diagnosed atrial fibrillation in the setting of pneumonia. We discussed the pathophysiology of atrial fibrillation including management strategies. The options of a cardioversion and a catheter ablation were discussed. Risks and benefits were discussed with each option. The procedures, outcomes and risks of ablation were reviewed. Patient is hesitant to proceed with an ablation at this time. It is not unreasonable to proceed with a cardioversion to restore sinus rhythm as well as assess if she feels any improvement in sinus rhythm.  We informed her she will likely recur at some point given her biatrial enlargement.  At that time we can consider antiarrhythmic medications versus ablation.  After all questions were answered, patient would like to proceed with a WAGNER guided cardioversion.  We will make arrangements for her procedure.  We informed her to continue her Eliquis including the morning of the procedure, and to hold her digoxin the morning of the procedure.\par \par Ms. Lacey appeared to understand the whole discussion and verbalized that all of her questions were answered to her satisfaction.\par \par Thank you for allowing me to be involved in the care of this pleasant patient. Please feel free to contact me with any questions.\par \par \par Aki Argueta MD\par  of Cardiology\par Electrophysiology Section\[address], [address] [place] [id number]\par Office: [phone]\par Fax: [phone]\par \par '\par

## 2023-01-03 NOTE — PHYSICAL EXAM
[Well Developed] : well developed [Well Nourished] : well nourished [No Acute Distress] : no acute distress [Normal Conjunctiva] : normal conjunctiva [Normal Venous Pressure] : normal venous pressure [Normal Rate] : normal [Irregularly Irregular] : irregularly irregular [Normal S1] : normal S1 [Normal S2] : normal S2 [No Gallop] : no gallop heard [No Murmur] : no murmurs heard [Clear Lung Fields] : clear lung fields [Good Air Entry] : good air entry [No Respiratory Distress] : no respiratory distress  [Soft] : abdomen soft [Non Tender] : non-tender [Normal Bowel Sounds] : normal bowel sounds [Normal Gait] : normal gait [Gait - Sufficient for Exercise Testing] : gait - sufficient for exercise testing [No Edema] : no edema [No Cyanosis] : no cyanosis [No Rash] : no rash [Moves all extremities] : moves all extremities [No Focal Deficits] : no focal deficits [Normal Speech] : normal speech [Alert and Oriented] : alert and oriented [Normal memory] : normal memory [Pericardial Rub] : no pericardial rub

## 2023-01-05 ENCOUNTER — APPOINTMENT (OUTPATIENT)
Dept: CARDIOLOGY | Facility: CLINIC | Age: 78
End: 2023-01-05
Payer: MEDICARE

## 2023-01-05 ENCOUNTER — NON-APPOINTMENT (OUTPATIENT)
Age: 78
End: 2023-01-05

## 2023-01-05 PROCEDURE — 93000 ELECTROCARDIOGRAM COMPLETE: CPT

## 2023-01-05 PROCEDURE — 99214 OFFICE O/P EST MOD 30 MIN: CPT

## 2023-01-05 NOTE — REASON FOR VISIT
[Spouse] : spouse [FreeTextEntry3] : Rolo Ruiz [FreeTextEntry1] : Follow-up visit CTA was negative for any significant CAD.  She feels little tired but no shortness of breath cough chest pain lightheadedness dizziness palpitations or edema.

## 2023-01-05 NOTE — ASSESSMENT
[FreeTextEntry1] : Atrial fibrillation age-indeterminate possibly recent onset rate now controlled and asymptomatic.\par Reported biatrial enlargement\par Abnormal nuclear stress however CTA without any significant CAD.  Fatigue may be related to medication or her atrial fibrillation

## 2023-01-05 NOTE — DISCUSSION/SUMMARY
[Patient] : the patient [Risks] : risks [Benefits] : benefits [Alternatives] : alternatives [___ Month(s)] : in [unfilled] month(s) [FreeTextEntry1] : Situation discussed results reviewed with patient and .  She is scheduled for cardioversion next week.  Follow-up with me in 1 month.  Consideration to ablation discussed.  Continue oral anticoagulation and current medication. [EKG obtained to assist in diagnosis and management of assessed problem(s)] : EKG obtained to assist in diagnosis and management of assessed problem(s)

## 2023-01-05 NOTE — CARDIOLOGY SUMMARY
[de-identified] : December 8, 2022 AF moderate rate\par January 5, 2023 A. fib moderate rate [de-identified] : November 2022 normal LV systolic function Marked biatrial enlargement [de-identified] : December 2022 minimal coronary atherosclerosis no stenosis

## 2023-01-05 NOTE — PHYSICAL EXAM
[No Edema] : no edema [Normal] : moves all extremities, no focal deficits, normal speech [de-identified] : Irregular no murmur

## 2023-01-06 ENCOUNTER — APPOINTMENT (OUTPATIENT)
Dept: PULMONOLOGY | Facility: CLINIC | Age: 78
End: 2023-01-06

## 2023-01-06 ENCOUNTER — APPOINTMENT (OUTPATIENT)
Dept: PULMONOLOGY | Facility: CLINIC | Age: 78
End: 2023-01-06
Payer: MEDICARE

## 2023-01-06 DIAGNOSIS — R91.8 OTHER NONSPECIFIC ABNORMAL FINDING OF LUNG FIELD: ICD-10-CM

## 2023-01-06 PROCEDURE — 99442: CPT | Mod: 95

## 2023-01-09 ENCOUNTER — APPOINTMENT (OUTPATIENT)
Dept: ELECTROPHYSIOLOGY | Facility: CLINIC | Age: 78
End: 2023-01-09

## 2023-01-09 NOTE — HISTORY OF PRESENT ILLNESS
[FreeTextEntry1] : I had the pleasure of seeing Alma Lacey today for consultation for AF management in the arrhythmia clinic at [facility]. As you well know, she is a pleasant 77 year old woman with a history of hypertension, breast cancer status post mastectomy, and recently diagnosed atrial fibrillation in the setting of pneumonia.  Patient reports she was in her usual state of health until mid November when she developed a cough.  She reports that the cough worsened and shortly after Thanksgiving had gone to seek evaluation for her cough.  She was found to be newly diagnosed atrial fibrillation with rapid ventricular rates and was also diagnosed with pneumonia and was given a course of antibiotics.  She was started on Eliquis as well as placed on AV reid blocking agents for rate control.  She she was discharged on digoxin 0.25 mg daily as well as metoprolol 100 mg twice daily.  She remains in persistent atrial fibrillation.  She denies any palpitations, chest pain, near-syncope, or syncope.  She reports some fatigue.  She also reports her last ECG was about 2 years ago which she states was normal.\par \par An echocardiogram performed in November 2022 demonstrated normal LV function as well as marked biatrial enlargement.\par

## 2023-01-09 NOTE — CARDIOLOGY SUMMARY
[de-identified] : 12/21/22: AF with VR 71 bpm [de-identified] : 12/20/2022: Medium sized, mild to moderate defects in the distal anterior, distal anteroseptal, and apical walls that are mostly fixed suggestive of infarct with minimal margot-infarct ischemia; medium sized, mild to moderate defect in the basal to mid inferior, basal to mid inferoseptal, and distal inferolateral walls that are fixed suggestive of infarct; LVEF 57%

## 2023-01-09 NOTE — PHYSICAL EXAM
[Well Developed] : well developed [Well Nourished] : well nourished [No Acute Distress] : no acute distress [Normal Conjunctiva] : normal conjunctiva [Normal Venous Pressure] : normal venous pressure [Normal Rate] : normal [Irregularly Irregular] : irregularly irregular [Normal S1] : normal S1 [Normal S2] : normal S2 [No Gallop] : no gallop heard [Pericardial Rub] : no pericardial rub [No Murmur] : no murmurs heard [Clear Lung Fields] : clear lung fields [Good Air Entry] : good air entry [No Respiratory Distress] : no respiratory distress  [Soft] : abdomen soft [Non Tender] : non-tender [Normal Bowel Sounds] : normal bowel sounds [Normal Gait] : normal gait [Gait - Sufficient for Exercise Testing] : gait - sufficient for exercise testing [No Edema] : no edema [No Cyanosis] : no cyanosis [No Rash] : no rash [Moves all extremities] : moves all extremities [No Focal Deficits] : no focal deficits [Normal Speech] : normal speech [Alert and Oriented] : alert and oriented [Normal memory] : normal memory

## 2023-01-09 NOTE — DISCUSSION/SUMMARY
[FreeTextEntry1] : In summary, Ms. Lacey is a 77 year old woman with a history of hypertension, breast cancer status post mastectomy, biatrial enlargement, and recently diagnosed atrial fibrillation in the setting of pneumonia. We discussed the pathophysiology of atrial fibrillation including management strategies. The options of a cardioversion and a catheter ablation were discussed. Risks and benefits were discussed with each option. The procedures, outcomes and risks of ablation were reviewed. Patient is hesitant to proceed with an ablation at this time. It is not unreasonable to proceed with a cardioversion to restore sinus rhythm as well as assess if she feels any improvement in sinus rhythm.  We informed her she will likely recur at some point given her biatrial enlargement.  At that time we can consider antiarrhythmic medications versus ablation.  After all questions were answered, patient would like to proceed with a WAGNER guided cardioversion.  We will make arrangements for her procedure.  We informed her to continue her Eliquis including the morning of the procedure, and to hold her digoxin the morning of the procedure.\par \par Ms. Lacey appeared to understand the whole discussion and verbalized that all of her questions were answered to her satisfaction.\par \par Thank you for allowing me to be involved in the care of this pleasant patient. Please feel free to contact me with any questions.\par \par \par Aki Argueta MD\par  of Cardiology\par Electrophysiology Section\[address], [address] [place] [id number]\par Office: [phone]\par Fax: [phone]\par \par '\par

## 2023-01-17 ENCOUNTER — APPOINTMENT (OUTPATIENT)
Dept: CV DIAGNOSITCS | Facility: HOSPITAL | Age: 78
End: 2023-01-17

## 2023-01-17 ENCOUNTER — OUTPATIENT (OUTPATIENT)
Dept: OUTPATIENT SERVICES | Facility: HOSPITAL | Age: 78
LOS: 1 days | End: 2023-01-17
Payer: MEDICARE

## 2023-01-17 ENCOUNTER — TRANSCRIPTION ENCOUNTER (OUTPATIENT)
Age: 78
End: 2023-01-17

## 2023-01-17 VITALS
HEART RATE: 60 BPM | RESPIRATION RATE: 16 BRPM | DIASTOLIC BLOOD PRESSURE: 58 MMHG | SYSTOLIC BLOOD PRESSURE: 112 MMHG | OXYGEN SATURATION: 98 %

## 2023-01-17 VITALS
DIASTOLIC BLOOD PRESSURE: 73 MMHG | HEART RATE: 61 BPM | RESPIRATION RATE: 16 BRPM | OXYGEN SATURATION: 97 % | WEIGHT: 195.11 LBS | SYSTOLIC BLOOD PRESSURE: 125 MMHG | HEIGHT: 64 IN

## 2023-01-17 DIAGNOSIS — Z96.659 PRESENCE OF UNSPECIFIED ARTIFICIAL KNEE JOINT: Chronic | ICD-10-CM

## 2023-01-17 DIAGNOSIS — Z90.12 ACQUIRED ABSENCE OF LEFT BREAST AND NIPPLE: Chronic | ICD-10-CM

## 2023-01-17 DIAGNOSIS — I48.91 UNSPECIFIED ATRIAL FIBRILLATION: ICD-10-CM

## 2023-01-17 LAB
ANION GAP SERPL CALC-SCNC: 13 MMOL/L — SIGNIFICANT CHANGE UP (ref 5–17)
APTT BLD: 44.8 SEC — HIGH (ref 27.5–35.5)
BUN SERPL-MCNC: 22 MG/DL — SIGNIFICANT CHANGE UP (ref 7–23)
CALCIUM SERPL-MCNC: 9.8 MG/DL — SIGNIFICANT CHANGE UP (ref 8.4–10.5)
CHLORIDE SERPL-SCNC: 101 MMOL/L — SIGNIFICANT CHANGE UP (ref 96–108)
CO2 SERPL-SCNC: 24 MMOL/L — SIGNIFICANT CHANGE UP (ref 22–31)
CREAT SERPL-MCNC: 0.89 MG/DL — SIGNIFICANT CHANGE UP (ref 0.5–1.3)
EGFR: 67 ML/MIN/1.73M2 — SIGNIFICANT CHANGE UP
GLUCOSE SERPL-MCNC: 119 MG/DL — HIGH (ref 70–99)
HCT VFR BLD CALC: 45.5 % — HIGH (ref 34.5–45)
HGB BLD-MCNC: 15.3 G/DL — SIGNIFICANT CHANGE UP (ref 11.5–15.5)
INR BLD: 1.62 RATIO — HIGH (ref 0.88–1.16)
MAGNESIUM SERPL-MCNC: 1.8 MG/DL — SIGNIFICANT CHANGE UP (ref 1.6–2.6)
MCHC RBC-ENTMCNC: 29.9 PG — SIGNIFICANT CHANGE UP (ref 27–34)
MCHC RBC-ENTMCNC: 33.6 GM/DL — SIGNIFICANT CHANGE UP (ref 32–36)
MCV RBC AUTO: 89 FL — SIGNIFICANT CHANGE UP (ref 80–100)
NRBC # BLD: 0 /100 WBCS — SIGNIFICANT CHANGE UP (ref 0–0)
PLATELET # BLD AUTO: 222 K/UL — SIGNIFICANT CHANGE UP (ref 150–400)
POTASSIUM SERPL-MCNC: 4.3 MMOL/L — SIGNIFICANT CHANGE UP (ref 3.5–5.3)
POTASSIUM SERPL-SCNC: 4.3 MMOL/L — SIGNIFICANT CHANGE UP (ref 3.5–5.3)
PROTHROM AB SERPL-ACNC: 18.9 SEC — HIGH (ref 10.5–13.4)
RBC # BLD: 5.11 M/UL — SIGNIFICANT CHANGE UP (ref 3.8–5.2)
RBC # FLD: 13.2 % — SIGNIFICANT CHANGE UP (ref 10.3–14.5)
SODIUM SERPL-SCNC: 138 MMOL/L — SIGNIFICANT CHANGE UP (ref 135–145)
WBC # BLD: 10.66 K/UL — HIGH (ref 3.8–10.5)
WBC # FLD AUTO: 10.66 K/UL — HIGH (ref 3.8–10.5)

## 2023-01-17 PROCEDURE — 85027 COMPLETE CBC AUTOMATED: CPT

## 2023-01-17 PROCEDURE — 93320 DOPPLER ECHO COMPLETE: CPT | Mod: 26

## 2023-01-17 PROCEDURE — 93010 ELECTROCARDIOGRAM REPORT: CPT

## 2023-01-17 PROCEDURE — 80048 BASIC METABOLIC PNL TOTAL CA: CPT

## 2023-01-17 PROCEDURE — 93005 ELECTROCARDIOGRAM TRACING: CPT

## 2023-01-17 PROCEDURE — 85610 PROTHROMBIN TIME: CPT

## 2023-01-17 PROCEDURE — 93010 ELECTROCARDIOGRAM REPORT: CPT | Mod: 77

## 2023-01-17 PROCEDURE — 93320 DOPPLER ECHO COMPLETE: CPT

## 2023-01-17 PROCEDURE — 93325 DOPPLER ECHO COLOR FLOW MAPG: CPT | Mod: 26

## 2023-01-17 PROCEDURE — 92960 CARDIOVERSION ELECTRIC EXT: CPT

## 2023-01-17 PROCEDURE — 93312 ECHO TRANSESOPHAGEAL: CPT

## 2023-01-17 PROCEDURE — 36415 COLL VENOUS BLD VENIPUNCTURE: CPT

## 2023-01-17 PROCEDURE — 93312 ECHO TRANSESOPHAGEAL: CPT | Mod: 26

## 2023-01-17 PROCEDURE — 83735 ASSAY OF MAGNESIUM: CPT

## 2023-01-17 PROCEDURE — 85730 THROMBOPLASTIN TIME PARTIAL: CPT

## 2023-01-17 PROCEDURE — 93325 DOPPLER ECHO COLOR FLOW MAPG: CPT

## 2023-01-17 NOTE — ASU DISCHARGE PLAN (ADULT/PEDIATRIC) - CARE PROVIDER_API CALL
Aki Argueta)  Cardiac Electrophysiology; Cardiology  [address] [place] [address]  Phone: [phone]  Fax: [phone]  Scheduled Appointment: 02/13/2023 09:00 AM

## 2023-01-17 NOTE — ASU PREOP CHECKLIST - HEIGHT IN FEET
Dr Brock - Please see [employer] message and advise    Okay for BH order for grief and stress?     5

## 2023-01-17 NOTE — ASU DISCHARGE PLAN (ADULT/PEDIATRIC) - NS MD DC FALL RISK RISK
For information on Fall & Injury Prevention, visit: [email or web address] OR  [email or web address] OR  https://www.cdc.gov/steadi/patient.html

## 2023-01-17 NOTE — H&P CARDIOLOGY - NSICDXPASTMEDICALHX_GEN_ALL_CORE_FT
PAST MEDICAL HISTORY:  Atrial fibrillation     Mild HTN     PNA (pneumonia)     Polymyalgia rheumatica     Pulmonary nodule

## 2023-01-17 NOTE — H&P CARDIOLOGY - HISTORY OF PRESENT ILLNESS
77 year old female, no reported implantable devices, with PMHx of HTN, recently diagnosed atrial fibrillation with RVR on Eliquis (last dose taken 1/17 AM) in the setting of pneumonia in November 2022, was seen for AF management in the arrhythmia clinic at [facility]. Patient was started on Eliquis as well as placed on AV reid blocking agents for rate control. She currently is in afib today and presents for DCCV/WAGNER with Dr. Argueta. Recent echo done in 11/2022 demonstrated normal LV function as well as marked biatrial enlargement. Patient currently denies chest pain, palpitations, SOB, dizziness.

## 2023-01-26 ENCOUNTER — NON-APPOINTMENT (OUTPATIENT)
Age: 78
End: 2023-01-26

## 2023-02-13 ENCOUNTER — APPOINTMENT (OUTPATIENT)
Dept: ELECTROPHYSIOLOGY | Facility: CLINIC | Age: 78
End: 2023-02-13
Payer: MEDICARE

## 2023-02-13 ENCOUNTER — NON-APPOINTMENT (OUTPATIENT)
Age: 78
End: 2023-02-13

## 2023-02-13 VITALS
WEIGHT: 196 LBS | DIASTOLIC BLOOD PRESSURE: 70 MMHG | OXYGEN SATURATION: 97 % | HEART RATE: 87 BPM | SYSTOLIC BLOOD PRESSURE: 130 MMHG | RESPIRATION RATE: 16 BRPM | HEIGHT: 64 IN | BODY MASS INDEX: 33.46 KG/M2

## 2023-02-13 PROBLEM — J18.9 PNEUMONIA, UNSPECIFIED ORGANISM: Chronic | Status: ACTIVE | Noted: 2023-01-17

## 2023-02-13 PROBLEM — I48.91 UNSPECIFIED ATRIAL FIBRILLATION: Chronic | Status: ACTIVE | Noted: 2023-01-17

## 2023-02-13 PROBLEM — R91.1 SOLITARY PULMONARY NODULE: Chronic | Status: ACTIVE | Noted: 2023-01-17

## 2023-02-13 PROCEDURE — 99214 OFFICE O/P EST MOD 30 MIN: CPT

## 2023-02-13 PROCEDURE — 93000 ELECTROCARDIOGRAM COMPLETE: CPT

## 2023-02-13 RX ORDER — DIGOXIN 250 UG/1
250 TABLET ORAL
Qty: 90 | Refills: 2 | Status: DISCONTINUED | COMMUNITY
End: 2023-02-13

## 2023-02-13 NOTE — HISTORY OF PRESENT ILLNESS
[FreeTextEntry1] : I had the pleasure of seeing Alma Lacey today for consultation for AF management in the arrhythmia clinic at [facility]. As you well know, she is a pleasant 77 year old woman with a history of hypertension, breast cancer status post mastectomy, and persistent atrial fibrillation diagnosed in the setting of pneumonia in 11/22.  Patient reports she was in her usual state of health until mid November when she developed a cough.  She reports that the cough worsened and shortly after Lisa had gone to seek evaluation for her cough.  She was found to be newly diagnosed atrial fibrillation with rapid ventricular rates and was also diagnosed with pneumonia and was given a course of antibiotics.  She was started on Eliquis as well as placed on AV reid blocking agents for rate control.  She was discharged on digoxin 0.25 mg daily as well as metoprolol 100 mg twice daily.  She was brought in for WAGNER/DCCV in 1/23 and returns now for follow up, noting resolution of prior persistent fatigue.\par \par An echocardiogram performed in November 2022 demonstrated normal LV function as well as marked biatrial enlargement.\par

## 2023-02-13 NOTE — DISCUSSION/SUMMARY
[FreeTextEntry1] : In summary, Ms. Lacey is a 77 year old woman with a history of hypertension, breast cancer status post mastectomy, biatrial enlargement, and recently diagnosed atrial fibrillation in the setting of pneumonia.  She is now status post WAGNER cardioversion and is maintaining sinus rhythm, albeit with frequent atrial ectopy.  She notes symptomatic improvement in sinus rhythm.  Digoxin has already been stopped, and today she will reduce metoprolol dosing to 50 mg twice daily as she believes it is contributing to ongoing fatigue.  She will follow-up in 6 months.  We discussed the high likelihood of clinical recurrence of her atrial fibrillation given the duration of prior arrhythmia and the presence of frequent atrial ectopy today.  In that event, I recommended transition to a rhythm control strategy with a preference for ablation as the most definitive means for maintaining sinus rhythm.  She will continue to follow her rhythm vis-à-vis her [employer] Watch.\par \par Ms. Lacey appeared to understand the whole discussion and verbalized that all of her questions were answered to her satisfaction.\par \par Thank you for allowing me to be involved in the care of this pleasant patient. Please feel free to contact me with any questions.\par \par \par Aki Argueta MD\par  of Cardiology\par Electrophysiology Section\par [address] [place] [address], [address] [place] [address]\[place] [address]\par Office: [phone]\par Fax: [phone]\par \par '\par  [EKG obtained to assist in diagnosis and management of assessed problem(s)] : EKG obtained to assist in diagnosis and management of assessed problem(s)

## 2023-02-13 NOTE — CARDIOLOGY SUMMARY
[de-identified] : 2/13/23: Sinus rhythm with frequent APCs\par 12/21/22: AF with VR 71 bpm [de-identified] : 12/20/2022: Medium sized, mild to moderate defects in the distal anterior, distal anteroseptal, and apical walls that are mostly fixed suggestive of infarct with minimal margot-infarct ischemia; medium sized, mild to moderate defect in the basal to mid inferior, basal to mid inferoseptal, and distal inferolateral walls that are fixed suggestive of infarct; LVEF 57%

## 2023-02-24 ENCOUNTER — APPOINTMENT (OUTPATIENT)
Dept: CARDIOLOGY | Facility: CLINIC | Age: 78
End: 2023-02-24
Payer: MEDICARE

## 2023-02-24 ENCOUNTER — NON-APPOINTMENT (OUTPATIENT)
Age: 78
End: 2023-02-24

## 2023-02-24 VITALS
BODY MASS INDEX: 33.46 KG/M2 | OXYGEN SATURATION: 90 % | SYSTOLIC BLOOD PRESSURE: 116 MMHG | DIASTOLIC BLOOD PRESSURE: 66 MMHG | WEIGHT: 196 LBS | HEART RATE: 70 BPM | HEIGHT: 64 IN

## 2023-02-24 PROCEDURE — 93000 ELECTROCARDIOGRAM COMPLETE: CPT

## 2023-02-24 PROCEDURE — 99214 OFFICE O/P EST MOD 30 MIN: CPT

## 2023-02-24 NOTE — PHYSICAL EXAM
[Normal S1, S2] : normal S1, S2 [No Edema] : no edema [Normal] : moves all extremities, no focal deficits, normal speech

## 2023-02-24 NOTE — HISTORY OF PRESENT ILLNESS
[FreeTextEntry1] : Status post cardioversion.  WAGNER with normal LV function no thrombus.  She feels well without chest pain dyspnea palpitations or edema.  Has seen Dr. Kendall MAGANA for follow-up.

## 2023-02-24 NOTE — CARDIOLOGY SUMMARY
[de-identified] : December 8, 2022 AF moderate rate\par January 5, 2023 A. fib moderate rate\par February 24, 2023 sinus with APCs nonspecific ST-T change [de-identified] : November 2022 normal LV systolic function Marked biatrial enlargement\par January 2023 WAGNER normal LV function [de-identified] : December 2022 minimal coronary atherosclerosis no stenosis calcium score of 1

## 2023-02-24 NOTE — ASSESSMENT
[FreeTextEntry1] : Paroxysmal A-fib status post cardioversion.  APCs.  Overweight.  Atrial enlargement.

## 2023-02-24 NOTE — REASON FOR VISIT
[Arrhythmia/ECG Abnorrmalities] : arrhythmia/ECG abnormalities [Other: ____] : [unfilled] [Spouse] : spouse [FreeTextEntry3] : Rolo Ruiz

## 2023-02-24 NOTE — DISCUSSION/SUMMARY
[Patient] : the patient [Risks] : risks [Benefits] : benefits [Alternatives] : alternatives [___ Month(s)] : in [unfilled] month(s) [FreeTextEntry1] : Reviewed note from Dr. Argueta discussed with patient and .  Weight loss maintain oral anticoagulation with Eliquis and beta-blocker metoprolol, may reduce her diuretic.  Increase activity as tolerated.  Risk of recurrent A-fib discussed and questions addressed.

## 2023-03-10 NOTE — PRE-ANESTHESIA EVALUATION ADULT - ANESTHESIA, PREVIOUS REACTION, PROFILE
March 10, 2023  EMPLOYEE INFORMATION: EMPLOYER INFORMATION:   NAME: Hang CALI   : 2003 [phone]    DATE OF INJURY/EVENT: 3/7/2023           Location: [facility]   Treating Provider: Jasmyne Garcia PA-C  Time In:  8:29 AM Time Out:  9:35 AM      DIAGNOSIS:   1. Acute thoracic myofascial strain, initial encounter      STATUS: This injury is determined to be WORK RELATED.    RETURN TO WORK:Employee may return to work with restrictions.   Return Date: 3/10/2023            RESTRICTIONS: Restrictions are to be followed at work and at home.  Restrictions are in effect until next follow-up visit.  No lifting, pushing, pulling more than 20-25lbs.  No repetitive bending, twisting, or rotating.   Keep lifting close to body, use legs to lift.  Keep lifting to waist or chest height.   No lifting more than 10 lbs overhead.      TREATMENT PLAN:   Medications for this injury/condition: Begin to cut down on muscle relaxer. Take 2 if needed during the day or may just take one prior to bed time. 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     May increase dosage of Ibuprofen or Advil. May increase to 3 to 4 tablets of 200mg each every 6 to 8 hours if needed.   Referral/Consult: None  Diagnostic Testing: None       Instructions: Alternate between ice and heat.   Perform stretches provided.     NEXT RETURN VISIT:   3/16/23 @ 8:15 am with Jasmyne Garcia PA-C  Thank you for the privilege of providing medical care for this injury/condition.  If there are any questions, please call the occupational health clinic at Dept: [phone].      Electronically signed on 3/10/2023 at 9:35 AM by:   Jasmyne Garcia PA-C   [facility]    
none

## 2023-06-01 ENCOUNTER — NON-APPOINTMENT (OUTPATIENT)
Age: 78
End: 2023-06-01

## 2023-06-01 ENCOUNTER — APPOINTMENT (OUTPATIENT)
Dept: CARDIOLOGY | Facility: CLINIC | Age: 78
End: 2023-06-01
Payer: MEDICARE

## 2023-06-01 VITALS
WEIGHT: 197 LBS | HEART RATE: 61 BPM | RESPIRATION RATE: 17 BRPM | BODY MASS INDEX: 33.63 KG/M2 | HEIGHT: 64 IN | OXYGEN SATURATION: 100 % | DIASTOLIC BLOOD PRESSURE: 76 MMHG | SYSTOLIC BLOOD PRESSURE: 120 MMHG

## 2023-06-01 PROCEDURE — 93000 ELECTROCARDIOGRAM COMPLETE: CPT

## 2023-06-01 PROCEDURE — 99214 OFFICE O/P EST MOD 30 MIN: CPT

## 2023-06-01 RX ORDER — FUROSEMIDE 40 MG/1
40 TABLET ORAL
Qty: 90 | Refills: 3 | Status: ACTIVE | COMMUNITY

## 2023-06-01 NOTE — CARDIOLOGY SUMMARY
[de-identified] : December 8, 2022 AF moderate rate\par January 5, 2023 A. fib moderate rate\par February 24, 2023 sinus with APCs nonspecific ST-T change\par 6/1/23 sinus apcs [de-identified] : November 2022 normal LV systolic function Marked biatrial enlargement\par January 2023 WAGNER normal LV function [de-identified] : December 2022 minimal coronary atherosclerosis no stenosis calcium score of 1

## 2023-06-01 NOTE — DISCUSSION/SUMMARY
[Patient] : the patient [Risks] : risks [Benefits] : benefits [Alternatives] : alternatives [___ Month(s)] : in [unfilled] month(s) [FreeTextEntry1] :   Weight loss maintain oral anticoagulation with Eliquis and beta-blocker metoprolol which will be changed to long-acting succinate once a day.  Lasix will be as needed.  Discussed atrial premature beats..  Increase activity as tolerated.  Risk of recurrent A-fib discussed and questions addressed. [EKG obtained to assist in diagnosis and management of assessed problem(s)] : EKG obtained to assist in diagnosis and management of assessed problem(s)

## 2023-06-01 NOTE — HISTORY OF PRESENT ILLNESS
[FreeTextEntry1] : Status post cardioversion.  WAGNER with normal LV function no thrombus.  She feels well without chest pain dyspnea palpitations or edema.

## 2023-09-11 ENCOUNTER — APPOINTMENT (OUTPATIENT)
Dept: CARDIOLOGY | Facility: CLINIC | Age: 78
End: 2023-09-11
Payer: MEDICARE

## 2023-09-11 ENCOUNTER — NON-APPOINTMENT (OUTPATIENT)
Age: 78
End: 2023-09-11

## 2023-09-11 VITALS
WEIGHT: 197 LBS | HEART RATE: 71 BPM | BODY MASS INDEX: 33.63 KG/M2 | OXYGEN SATURATION: 99 % | DIASTOLIC BLOOD PRESSURE: 62 MMHG | SYSTOLIC BLOOD PRESSURE: 96 MMHG | HEIGHT: 64 IN

## 2023-09-11 DIAGNOSIS — I49.1 ATRIAL PREMATURE DEPOLARIZATION: ICD-10-CM

## 2023-09-11 PROCEDURE — 99214 OFFICE O/P EST MOD 30 MIN: CPT

## 2023-09-11 PROCEDURE — 93000 ELECTROCARDIOGRAM COMPLETE: CPT

## 2023-11-13 ENCOUNTER — NON-APPOINTMENT (OUTPATIENT)
Age: 78
End: 2023-11-13

## 2023-11-13 ENCOUNTER — APPOINTMENT (OUTPATIENT)
Dept: ELECTROPHYSIOLOGY | Facility: CLINIC | Age: 78
End: 2023-11-13
Payer: MEDICARE

## 2023-11-13 VITALS
HEIGHT: 64 IN | HEART RATE: 68 BPM | SYSTOLIC BLOOD PRESSURE: 114 MMHG | RESPIRATION RATE: 18 BRPM | DIASTOLIC BLOOD PRESSURE: 70 MMHG | OXYGEN SATURATION: 99 % | WEIGHT: 197 LBS | BODY MASS INDEX: 33.63 KG/M2 | TEMPERATURE: 95.3 F

## 2023-11-13 PROCEDURE — 93000 ELECTROCARDIOGRAM COMPLETE: CPT

## 2023-11-13 PROCEDURE — 99214 OFFICE O/P EST MOD 30 MIN: CPT

## 2023-12-11 ENCOUNTER — APPOINTMENT (OUTPATIENT)
Dept: DERMATOLOGY | Facility: CLINIC | Age: 78
End: 2023-12-11
Payer: MEDICARE

## 2023-12-11 DIAGNOSIS — L81.4 OTHER MELANIN HYPERPIGMENTATION: ICD-10-CM

## 2023-12-11 DIAGNOSIS — Z12.83 ENCOUNTER FOR SCREENING FOR MALIGNANT NEOPLASM OF SKIN: ICD-10-CM

## 2023-12-11 DIAGNOSIS — D22.9 MELANOCYTIC NEVI, UNSPECIFIED: ICD-10-CM

## 2023-12-11 PROCEDURE — 99203 OFFICE O/P NEW LOW 30 MIN: CPT

## 2023-12-27 ENCOUNTER — INPATIENT (INPATIENT)
Facility: HOSPITAL | Age: 78
LOS: 1 days | Discharge: HOME | DRG: 310 | End: 2023-12-29
Attending: STUDENT IN AN ORGANIZED HEALTH CARE EDUCATION/TRAINING PROGRAM | Admitting: INTERNAL MEDICINE
Payer: MEDICARE

## 2023-12-27 VITALS
WEIGHT: 201.94 LBS | TEMPERATURE: 99 F | HEART RATE: 112 BPM | DIASTOLIC BLOOD PRESSURE: 57 MMHG | HEIGHT: 64 IN | OXYGEN SATURATION: 98 % | RESPIRATION RATE: 16 BRPM | SYSTOLIC BLOOD PRESSURE: 131 MMHG

## 2023-12-27 DIAGNOSIS — Z90.12 ACQUIRED ABSENCE OF LEFT BREAST AND NIPPLE: Chronic | ICD-10-CM

## 2023-12-27 DIAGNOSIS — Z96.659 PRESENCE OF UNSPECIFIED ARTIFICIAL KNEE JOINT: Chronic | ICD-10-CM

## 2023-12-27 DIAGNOSIS — I48.91 UNSPECIFIED ATRIAL FIBRILLATION: ICD-10-CM

## 2023-12-27 LAB
ANION GAP SERPL CALC-SCNC: 9 MMOL/L — SIGNIFICANT CHANGE UP (ref 5–17)
ANION GAP SERPL CALC-SCNC: 9 MMOL/L — SIGNIFICANT CHANGE UP (ref 5–17)
BLD GP AB SCN SERPL QL: NEGATIVE — SIGNIFICANT CHANGE UP
BLD GP AB SCN SERPL QL: NEGATIVE — SIGNIFICANT CHANGE UP
BUN SERPL-MCNC: 25 MG/DL — HIGH (ref 7–23)
BUN SERPL-MCNC: 25 MG/DL — HIGH (ref 7–23)
CALCIUM SERPL-MCNC: 9.5 MG/DL — SIGNIFICANT CHANGE UP (ref 8.4–10.5)
CALCIUM SERPL-MCNC: 9.5 MG/DL — SIGNIFICANT CHANGE UP (ref 8.4–10.5)
CHLORIDE SERPL-SCNC: 107 MMOL/L — SIGNIFICANT CHANGE UP (ref 96–108)
CHLORIDE SERPL-SCNC: 107 MMOL/L — SIGNIFICANT CHANGE UP (ref 96–108)
CO2 SERPL-SCNC: 26 MMOL/L — SIGNIFICANT CHANGE UP (ref 22–31)
CO2 SERPL-SCNC: 26 MMOL/L — SIGNIFICANT CHANGE UP (ref 22–31)
CREAT SERPL-MCNC: 1 MG/DL — SIGNIFICANT CHANGE UP (ref 0.5–1.3)
CREAT SERPL-MCNC: 1 MG/DL — SIGNIFICANT CHANGE UP (ref 0.5–1.3)
EGFR: 58 ML/MIN/1.73M2 — LOW
EGFR: 58 ML/MIN/1.73M2 — LOW
GLUCOSE SERPL-MCNC: 116 MG/DL — HIGH (ref 70–99)
GLUCOSE SERPL-MCNC: 116 MG/DL — HIGH (ref 70–99)
HCT VFR BLD CALC: 39 % — SIGNIFICANT CHANGE UP (ref 34.5–45)
HCT VFR BLD CALC: 39 % — SIGNIFICANT CHANGE UP (ref 34.5–45)
HGB BLD-MCNC: 13.2 G/DL — SIGNIFICANT CHANGE UP (ref 11.5–15.5)
HGB BLD-MCNC: 13.2 G/DL — SIGNIFICANT CHANGE UP (ref 11.5–15.5)
MCHC RBC-ENTMCNC: 30.7 PG — SIGNIFICANT CHANGE UP (ref 27–34)
MCHC RBC-ENTMCNC: 30.7 PG — SIGNIFICANT CHANGE UP (ref 27–34)
MCHC RBC-ENTMCNC: 33.8 GM/DL — SIGNIFICANT CHANGE UP (ref 32–36)
MCHC RBC-ENTMCNC: 33.8 GM/DL — SIGNIFICANT CHANGE UP (ref 32–36)
MCV RBC AUTO: 90.7 FL — SIGNIFICANT CHANGE UP (ref 80–100)
MCV RBC AUTO: 90.7 FL — SIGNIFICANT CHANGE UP (ref 80–100)
NRBC # BLD: 0 /100 WBCS — SIGNIFICANT CHANGE UP (ref 0–0)
NRBC # BLD: 0 /100 WBCS — SIGNIFICANT CHANGE UP (ref 0–0)
PLATELET # BLD AUTO: 219 K/UL — SIGNIFICANT CHANGE UP (ref 150–400)
PLATELET # BLD AUTO: 219 K/UL — SIGNIFICANT CHANGE UP (ref 150–400)
POTASSIUM SERPL-MCNC: 4.2 MMOL/L — SIGNIFICANT CHANGE UP (ref 3.5–5.3)
POTASSIUM SERPL-MCNC: 4.2 MMOL/L — SIGNIFICANT CHANGE UP (ref 3.5–5.3)
POTASSIUM SERPL-SCNC: 4.2 MMOL/L — SIGNIFICANT CHANGE UP (ref 3.5–5.3)
POTASSIUM SERPL-SCNC: 4.2 MMOL/L — SIGNIFICANT CHANGE UP (ref 3.5–5.3)
RBC # BLD: 4.3 M/UL — SIGNIFICANT CHANGE UP (ref 3.8–5.2)
RBC # BLD: 4.3 M/UL — SIGNIFICANT CHANGE UP (ref 3.8–5.2)
RBC # FLD: 13 % — SIGNIFICANT CHANGE UP (ref 10.3–14.5)
RBC # FLD: 13 % — SIGNIFICANT CHANGE UP (ref 10.3–14.5)
RH IG SCN BLD-IMP: POSITIVE — SIGNIFICANT CHANGE UP
RH IG SCN BLD-IMP: POSITIVE — SIGNIFICANT CHANGE UP
SODIUM SERPL-SCNC: 142 MMOL/L — SIGNIFICANT CHANGE UP (ref 135–145)
SODIUM SERPL-SCNC: 142 MMOL/L — SIGNIFICANT CHANGE UP (ref 135–145)
WBC # BLD: 8.81 K/UL — SIGNIFICANT CHANGE UP (ref 3.8–10.5)
WBC # BLD: 8.81 K/UL — SIGNIFICANT CHANGE UP (ref 3.8–10.5)
WBC # FLD AUTO: 8.81 K/UL — SIGNIFICANT CHANGE UP (ref 3.8–10.5)
WBC # FLD AUTO: 8.81 K/UL — SIGNIFICANT CHANGE UP (ref 3.8–10.5)

## 2023-12-27 PROCEDURE — 99223 1ST HOSP IP/OBS HIGH 75: CPT

## 2023-12-27 PROCEDURE — 93010 ELECTROCARDIOGRAM REPORT: CPT | Mod: 76

## 2023-12-27 RX ORDER — APIXABAN 2.5 MG/1
5 TABLET, FILM COATED ORAL EVERY 12 HOURS
Refills: 0 | Status: DISCONTINUED | OUTPATIENT
Start: 2023-12-27 | End: 2023-12-29

## 2023-12-27 RX ORDER — LISINOPRIL 2.5 MG/1
10 TABLET ORAL DAILY
Refills: 0 | Status: DISCONTINUED | OUTPATIENT
Start: 2023-12-27 | End: 2023-12-29

## 2023-12-27 RX ORDER — SOTALOL HCL 120 MG
80 TABLET ORAL EVERY 12 HOURS
Refills: 0 | Status: DISCONTINUED | OUTPATIENT
Start: 2023-12-27 | End: 2023-12-29

## 2023-12-27 RX ORDER — METOPROLOL TARTRATE 50 MG
100 TABLET ORAL DAILY
Refills: 0 | Status: COMPLETED | OUTPATIENT
Start: 2023-12-27 | End: 2023-12-27

## 2023-12-27 RX ORDER — FUROSEMIDE 40 MG
40 TABLET ORAL DAILY
Refills: 0 | Status: DISCONTINUED | OUTPATIENT
Start: 2023-12-27 | End: 2023-12-29

## 2023-12-27 RX ADMIN — Medication 100 MILLIGRAM(S): at 18:56

## 2023-12-27 RX ADMIN — APIXABAN 5 MILLIGRAM(S): 2.5 TABLET, FILM COATED ORAL at 18:18

## 2023-12-27 RX ADMIN — Medication 80 MILLIGRAM(S): at 15:23

## 2023-12-27 NOTE — H&P CARDIOLOGY - NS ATTEND AMEND GEN_ALL_CORE FT
Patient seen at bedside in [facility]. This is a patient of Dr. Argueta's who was scheduled for a Sotalol initiation and DCCV. I spoke with the patient and her  at bedside as the covering physician. We discussed the possibility of rescheduling when Dr. Argueta is available versus proceeding. The patient was amendable to being under my care. Start Sotalol 80mg bid. Hold am dose of B-blocker and perform DCCV tomorrow. Monitor renal function. Monitor QTc 2 hours after each dose for the first 5 doses of Sotalol. Avoid QT prolonging medications. c/w Eliquis.    J. Gabriels

## 2023-12-27 NOTE — H&P CARDIOLOGY - HISTORY OF PRESENT ILLNESS
This is a 78 year old female, no reported implantable devices, with PMHx of HTN, recently diagnosed Atrial fibrillation with RVR on Eliquis (last dose taken 12/27/23 am ) hx DCCV/WAGNER last January 2023 and  Dec 8,2023  with Dr. Argueta. Recent echo done in 11/2022 demonstrated normal LV function as well as marked biatrial enlargement. Now presents for Medical management Sotalol . Admit to Dr. Argueta. Pt currently in Atrial FLutter 4:1 .  Patient currently denies chest pain, palpitations, SOB, dizziness.  < from: Transesophageal Echocardiogram w/o TTE (01.17.23 @ 09:33) >  Conclusions:  1. Spontaneous echo contrast seen.   No left atrial or left  atrial appendage thrombus.  2. Normal left ventricular internal dimensions and wall  thicknesses.  3. Normal left ventricular systolic function. No segmental  wall motion abnormalities.  4. Normal right ventricular size and function.  5. Agitated saline injection and color flow Doppler  demonstrates no evidence of a patent foramen ovale.  *** No previous Echo exam.  ------------------------------------------------------------------------  Confirmed on  1/17/2023 - 17:00:09 by JEMIMA Mendez  ------------------------------------------------------------------------    < end of copied text >  < from: TTE Echo Complete w/o Contrast w/ Doppler (11.28.22 @ 07:57) >  Summary:   1. The heart rate is tachycardic throughout the study.   2. Normal global left ventricular systolic function.   3. Left ventricular ejection fraction, by visual estimation, is 55 to   60%.   4. Normal left ventricular internal cavity size.   5. The mitral in-flow pattern reveals no discernable A-wave, therefore   no comment on diastolic function can be made.   6. Normal right ventricular size and function.   7. Severely enlarged left atrium.   8. Severely enlarged right atrium.   9. Mild thickening and calcification of the anterior and posterior   mitral valve leaflets.  10. Moderate posterior mitral annular calcification.  11. Mild mitral valve regurgitation.  12. Mild tricuspid regurgitation.  13. Mild aortic valve leaflet calcification. No aortic valve stenosis.  14. Estimated pulmonary artery systolic pressure is 41.4 mmHg assuming a   right atrial pressure of 15 mmHg, which is consistent with mildpulmonary   hypertension.  15. There is no evidence of pericardial effusion.    [id number] Kelin Nielsen MD Electronically signed on   11/28/2022 at 11:59:53 AM          < end of copied text >  < from: Nuclear Stress Test-Pharmacologic (Nuclear Stress Test-Pharmacologic .) (12.20.22 @ 10:15) >  IMPRESSIONS:Abnormal Study  * Symptom: Shortness of breath.  * HR Response: Appropriate; BP Response: Appropriate.  * Heart Rhythm: Atrial Fibrillation - 79 BPM.  * Baseline ECG: Nonspecific ST-T wave abnormality.  * ECG Changes: There were less than 0.5 mm downsloping ST  segment depressions in leads II, III, aVF, and V4-V6  starting at 2:00 min following regadenoson infusion at 86  bpm persisting 3:52 min in recovery.  These changes did  not meet ischemic criteria.  * Arrhythmia: Rare VPDs occurred during recovery.  * The left ventricle was normal in size.  * There are medium-sized, mild to moderate defects in the  distal anterior, distal anteroseptal, and apical walls  that are mostly fixed suggestive of infarct with minimal  margot-infarct ischemia.  * There are medium-sized, mild to moderate defects in the  basal to mid inferior, basal to mid inferoseptal, and  distal inferolateral walls that are fixed suggestive of  infarct.  * Post-stress gated wall motion analysis was performed  (LVEF = 57 %;LVEDV = 75 ml.) revealing mild hypokinesis of  the basal to mid inferior and basal inferoseptal walls and  reduced systolic thickening of the distal inferolateral,  distal anterior, distal anteroseptal, and apical walls.  *** No previous Nuclear/Stress exam.  ------------------------------------------------------------------------  Confirmed on  12/20/2022 - 13:34:06 by Rashid Beasley M.D.  ------------------------------------------------------------------------    < end of copied text >   This is a 78 year old female, no reported implantable devices, with PMHx of HTN, recently diagnosed Atrial fibrillation with RVR on Eliquis (last dose taken 12/27/23 am ) hx DCCV/WAGNER last January 2023 and  Dec 8,2023  with Dr. Argueta. Recent echo done in 11/2022 demonstrated normal LV function as well as marked biatrial enlargement. Now presents for Medical management Sotalol . Admit to Dr. Argueta. Pt currently in Atrial FLutter 4:1 .  Patient currently denies chest pain, palpitations, SOB, dizziness.  < from: Transesophageal Echocardiogram w/o TTE (01.17.23 @ 09:33) >  Conclusions:  1. Spontaneous echo contrast seen.   No left atrial or left  atrial appendage thrombus.  2. Normal left ventricular internal dimensions and wall  thicknesses.  3. Normal left ventricular systolic function. No segmental  wall motion abnormalities.  4. Normal right ventricular size and function.  5. Agitated saline injection and color flow Doppler  demonstrates no evidence of a patent foramen ovale.  *** No previous Echo exam.  ------------------------------------------------------------------------  Confirmed on  1/17/2023 - 17:00:09 by JEMIMA Mendez  ------------------------------------------------------------------------    < end of copied text >  < from: TTE Echo Complete w/o Contrast w/ Doppler (11.28.22 @ 07:57) >  Summary:   1. The heart rate is tachycardic throughout the study.   2. Normal global left ventricular systolic function.   3. Left ventricular ejection fraction, by visual estimation, is 55 to   60%.   4. Normal left ventricular internal cavity size.   5. The mitral in-flow pattern reveals no discernable A-wave, therefore   no comment on diastolic function can be made.   6. Normal right ventricular size and function.   7. Severely enlarged left atrium.   8. Severely enlarged right atrium.   9. Mild thickening and calcification of the anterior and posterior   mitral valve leaflets.  10. Moderate posterior mitral annular calcification.  11. Mild mitral valve regurgitation.  12. Mild tricuspid regurgitation.  13. Mild aortic valve leaflet calcification. No aortic valve stenosis.  14. Estimated pulmonary artery systolic pressure is 41.4 mmHg assuming a   right atrial pressure of 15 mmHg, which is consistent with mildpulmonary   hypertension.  15. There is no evidence of pericardial effusion.    [id number] Kelin Nielsen MD Electronically signed on   11/28/2022 at 11:59:53 AM          < end of copied text >  < from: Nuclear Stress Test-Pharmacologic (Nuclear Stress Test-Pharmacologic .) (12.20.22 @ 10:15) >  IMPRESSIONS:Abnormal Study  * Symptom: Shortness of breath.  * HR Response: Appropriate; BP Response: Appropriate.  * Heart Rhythm: Atrial Fibrillation - 79 BPM.  * Baseline ECG: Nonspecific ST-T wave abnormality.  * ECG Changes: There were less than 0.5 mm downsloping ST  segment depressions in leads II, III, aVF, and V4-V6  starting at 2:00 min following regadenoson infusion at 86  bpm persisting 3:52 min in recovery.  These changes did  not meet ischemic criteria.  * Arrhythmia: Rare VPDs occurred during recovery.  * The left ventricle was normal in size.  * There are medium-sized, mild to moderate defects in the  distal anterior, distal anteroseptal, and apical walls  that are mostly fixed suggestive of infarct with minimal  margot-infarct ischemia.  * There are medium-sized, mild to moderate defects in the  basal to mid inferior, basal to mid inferoseptal, and  distal inferolateral walls that are fixed suggestive of  infarct.  * Post-stress gated wall motion analysis was performed  (LVEF = 57 %;LVEDV = 75 ml.) revealing mild hypokinesis of  the basal to mid inferior and basal inferoseptal walls and  reduced systolic thickening of the distal inferolateral,  distal anterior, distal anteroseptal, and apical walls.  *** No previous Nuclear/Stress exam.  ------------------------------------------------------------------------  Confirmed on  12/20/2022 - 13:34:06 by Rashid Beasley M.D.  ------------------------------------------------------------------------    < end of copied text >   This is a 78 year old female, no reported implantable devices, with PMHx of HTN, recently diagnosed Atrial fibrillation with RVR on Eliquis (last dose taken 12/27/23 am ) hx DCCV/WAGNER last January 2023 and  Dec 8,2023  with Dr. Argueta. Recent echo done in 11/2022 demonstrated normal LV function as well as marked biatrial enlargement. Pt with recent complaints of SOB on exertion and climbing stairs relieved with rest . Now presents for Medical management Sotalol . Admit to Dr. Argueta. Pt currently in Atrial FLutter 4:1 .  Patient currently denies chest pain, palpitations, SOB, dizziness.  < from: Transesophageal Echocardiogram w/o TTE (01.17.23 @ 09:33) >  Conclusions:  1. Spontaneous echo contrast seen.   No left atrial or left  atrial appendage thrombus.  2. Normal left ventricular internal dimensions and wall  thicknesses.  3. Normal left ventricular systolic function. No segmental  wall motion abnormalities.  4. Normal right ventricular size and function.  5. Agitated saline injection and color flow Doppler  demonstrates no evidence of a patent foramen ovale.  *** No previous Echo exam.  ------------------------------------------------------------------------  Confirmed on  1/17/2023 - 17:00:09 by JEMIMA Mendez  ------------------------------------------------------------------------    < end of copied text >  < from: TTE Echo Complete w/o Contrast w/ Doppler (11.28.22 @ 07:57) >  Summary:   1. The heart rate is tachycardic throughout the study.   2. Normal global left ventricular systolic function.   3. Left ventricular ejection fraction, by visual estimation, is 55 to   60%.   4. Normal left ventricular internal cavity size.   5. The mitral in-flow pattern reveals no discernable A-wave, therefore   no comment on diastolic function can be made.   6. Normal right ventricular size and function.   7. Severely enlarged left atrium.   8. Severely enlarged right atrium.   9. Mild thickening and calcification of the anterior and posterior   mitral valve leaflets.  10. Moderate posterior mitral annular calcification.  11. Mild mitral valve regurgitation.  12. Mild tricuspid regurgitation.  13. Mild aortic valve leaflet calcification. No aortic valve stenosis.  14. Estimated pulmonary artery systolic pressure is 41.4 mmHg assuming a   right atrial pressure of 15 mmHg, which is consistent with mildpulmonary   hypertension.  15. There is no evidence of pericardial effusion.    [id number] Kelin Nielsen MD Electronically signed on   11/28/2022 at 11:59:53 AM          < end of copied text >  < from: Nuclear Stress Test-Pharmacologic (Nuclear Stress Test-Pharmacologic .) (12.20.22 @ 10:15) >  IMPRESSIONS:Abnormal Study  * Symptom: Shortness of breath.  * HR Response: Appropriate; BP Response: Appropriate.  * Heart Rhythm: Atrial Fibrillation - 79 BPM.  * Baseline ECG: Nonspecific ST-T wave abnormality.  * ECG Changes: There were less than 0.5 mm downsloping ST  segment depressions in leads II, III, aVF, and V4-V6  starting at 2:00 min following regadenoson infusion at 86  bpm persisting 3:52 min in recovery.  These changes did  not meet ischemic criteria.  * Arrhythmia: Rare VPDs occurred during recovery.  * The left ventricle was normal in size.  * There are medium-sized, mild to moderate defects in the  distal anterior, distal anteroseptal, and apical walls  that are mostly fixed suggestive of infarct with minimal  margot-infarct ischemia.  * There are medium-sized, mild to moderate defects in the  basal to mid inferior, basal to mid inferoseptal, and  distal inferolateral walls that are fixed suggestive of  infarct.  * Post-stress gated wall motion analysis was performed  (LVEF = 57 %;LVEDV = 75 ml.) revealing mild hypokinesis of  the basal to mid inferior and basal inferoseptal walls and  reduced systolic thickening of the distal inferolateral,  distal anterior, distal anteroseptal, and apical walls.  *** No previous Nuclear/Stress exam.  ------------------------------------------------------------------------  Confirmed on  12/20/2022 - 13:34:06 by Rashid Beasley M.D.  ------------------------------------------------------------------------    < end of copied text >   This is a 78 year old female, no reported implantable devices, with PMHx of HTN, recently diagnosed Atrial fibrillation with RVR on Eliquis (last dose taken 12/27/23 am ) hx DCCV/WAGNER last January 2023 and  Dec 8,2023  with Dr. Argueta. Recent echo done in 11/2022 demonstrated normal LV function as well as marked biatrial enlargement. Pt with recent complaints of SOB on exertion and climbing stairs relieved with rest . Now presents for Medical management Sotalol . Admit to Dr. Argueta. Pt currently in Atrial FLutter 4:1 .  Patient currently denies chest pain, palpitations, SOB, dizziness.  < from: Transesophageal Echocardiogram w/o TTE (01.17.23 @ 09:33) >  Conclusions:  1. Spontaneous echo contrast seen.   No left atrial or left  atrial appendage thrombus.  2. Normal left ventricular internal dimensions and wall  thicknesses.  3. Normal left ventricular systolic function. No segmental  wall motion abnormalities.  4. Normal right ventricular size and function.  5. Agitated saline injection and color flow Doppler  demonstrates no evidence of a patent foramen ovale.  *** No previous Echo exam.  ------------------------------------------------------------------------  Confirmed on  1/17/2023 - 17:00:09 by JEMIMA Mendez  ------------------------------------------------------------------------    < end of copied text >  < from: TTE Echo Complete w/o Contrast w/ Doppler (11.28.22 @ 07:57) >  Summary:   1. The heart rate is tachycardic throughout the study.   2. Normal global left ventricular systolic function.   3. Left ventricular ejection fraction, by visual estimation, is 55 to   60%.   4. Normal left ventricular internal cavity size.   5. The mitral in-flow pattern reveals no discernable A-wave, therefore   no comment on diastolic function can be made.   6. Normal right ventricular size and function.   7. Severely enlarged left atrium.   8. Severely enlarged right atrium.   9. Mild thickening and calcification of the anterior and posterior   mitral valve leaflets.  10. Moderate posterior mitral annular calcification.  11. Mild mitral valve regurgitation.  12. Mild tricuspid regurgitation.  13. Mild aortic valve leaflet calcification. No aortic valve stenosis.  14. Estimated pulmonary artery systolic pressure is 41.4 mmHg assuming a   right atrial pressure of 15 mmHg, which is consistent with mildpulmonary   hypertension.  15. There is no evidence of pericardial effusion.    [id number] Kelin Nielsen MD Electronically signed on   11/28/2022 at 11:59:53 AM          < end of copied text >  < from: Nuclear Stress Test-Pharmacologic (Nuclear Stress Test-Pharmacologic .) (12.20.22 @ 10:15) >  IMPRESSIONS:Abnormal Study  * Symptom: Shortness of breath.  * HR Response: Appropriate; BP Response: Appropriate.  * Heart Rhythm: Atrial Fibrillation - 79 BPM.  * Baseline ECG: Nonspecific ST-T wave abnormality.  * ECG Changes: There were less than 0.5 mm downsloping ST  segment depressions in leads II, III, aVF, and V4-V6  starting at 2:00 min following regadenoson infusion at 86  bpm persisting 3:52 min in recovery.  These changes did  not meet ischemic criteria.  * Arrhythmia: Rare VPDs occurred during recovery.  * The left ventricle was normal in size.  * There are medium-sized, mild to moderate defects in the  distal anterior, distal anteroseptal, and apical walls  that are mostly fixed suggestive of infarct with minimal  margot-infarct ischemia.  * There are medium-sized, mild to moderate defects in the  basal to mid inferior, basal to mid inferoseptal, and  distal inferolateral walls that are fixed suggestive of  infarct.  * Post-stress gated wall motion analysis was performed  (LVEF = 57 %;LVEDV = 75 ml.) revealing mild hypokinesis of  the basal to mid inferior and basal inferoseptal walls and  reduced systolic thickening of the distal inferolateral,  distal anterior, distal anteroseptal, and apical walls.  *** No previous Nuclear/Stress exam.  ------------------------------------------------------------------------  Confirmed on  12/20/2022 - 13:34:06 by Rashid Beasley M.D.  ------------------------------------------------------------------------    < end of copied text >

## 2023-12-27 NOTE — PATIENT PROFILE ADULT - FALL HARM RISK - UNIVERSAL INTERVENTIONS
Bed in lowest position, wheels locked, appropriate side rails in place/Call bell, personal items and telephone in reach/Instruct patient to call for assistance before getting out of bed or chair/Non-slip footwear when patient is out of bed/[place] to call system/Physically safe environment - no spills, clutter or unnecessary equipment/Purposeful Proactive Rounding/Room/bathroom lighting operational, light cord in reach Bed in lowest position, wheels locked, appropriate side rails in place/Call bell, personal items and telephone in reach/Instruct patient to call for assistance before getting out of bed or chair/Non-slip footwear when patient is out of bed/[place] to call system/Physically safe environment - no spills, clutter or unnecessary equipment/Purposeful Proactive Rounding/Room/bathroom lighting operational, light cord in reach

## 2023-12-28 LAB
ANION GAP SERPL CALC-SCNC: 9 MMOL/L — SIGNIFICANT CHANGE UP (ref 5–17)
ANION GAP SERPL CALC-SCNC: 9 MMOL/L — SIGNIFICANT CHANGE UP (ref 5–17)
BUN SERPL-MCNC: 23 MG/DL — SIGNIFICANT CHANGE UP (ref 7–23)
BUN SERPL-MCNC: 23 MG/DL — SIGNIFICANT CHANGE UP (ref 7–23)
CALCIUM SERPL-MCNC: 8.9 MG/DL — SIGNIFICANT CHANGE UP (ref 8.4–10.5)
CALCIUM SERPL-MCNC: 8.9 MG/DL — SIGNIFICANT CHANGE UP (ref 8.4–10.5)
CHLORIDE SERPL-SCNC: 107 MMOL/L — SIGNIFICANT CHANGE UP (ref 96–108)
CHLORIDE SERPL-SCNC: 107 MMOL/L — SIGNIFICANT CHANGE UP (ref 96–108)
CO2 SERPL-SCNC: 24 MMOL/L — SIGNIFICANT CHANGE UP (ref 22–31)
CO2 SERPL-SCNC: 24 MMOL/L — SIGNIFICANT CHANGE UP (ref 22–31)
CREAT SERPL-MCNC: 0.76 MG/DL — SIGNIFICANT CHANGE UP (ref 0.5–1.3)
CREAT SERPL-MCNC: 0.76 MG/DL — SIGNIFICANT CHANGE UP (ref 0.5–1.3)
EGFR: 80 ML/MIN/1.73M2 — SIGNIFICANT CHANGE UP
EGFR: 80 ML/MIN/1.73M2 — SIGNIFICANT CHANGE UP
GLUCOSE SERPL-MCNC: 103 MG/DL — HIGH (ref 70–99)
GLUCOSE SERPL-MCNC: 103 MG/DL — HIGH (ref 70–99)
HCT VFR BLD CALC: 35.6 % — SIGNIFICANT CHANGE UP (ref 34.5–45)
HCT VFR BLD CALC: 35.6 % — SIGNIFICANT CHANGE UP (ref 34.5–45)
HGB BLD-MCNC: 12 G/DL — SIGNIFICANT CHANGE UP (ref 11.5–15.5)
HGB BLD-MCNC: 12 G/DL — SIGNIFICANT CHANGE UP (ref 11.5–15.5)
MCHC RBC-ENTMCNC: 30.3 PG — SIGNIFICANT CHANGE UP (ref 27–34)
MCHC RBC-ENTMCNC: 30.3 PG — SIGNIFICANT CHANGE UP (ref 27–34)
MCHC RBC-ENTMCNC: 33.7 GM/DL — SIGNIFICANT CHANGE UP (ref 32–36)
MCHC RBC-ENTMCNC: 33.7 GM/DL — SIGNIFICANT CHANGE UP (ref 32–36)
MCV RBC AUTO: 89.9 FL — SIGNIFICANT CHANGE UP (ref 80–100)
MCV RBC AUTO: 89.9 FL — SIGNIFICANT CHANGE UP (ref 80–100)
NRBC # BLD: 0 /100 WBCS — SIGNIFICANT CHANGE UP (ref 0–0)
NRBC # BLD: 0 /100 WBCS — SIGNIFICANT CHANGE UP (ref 0–0)
PLATELET # BLD AUTO: 197 K/UL — SIGNIFICANT CHANGE UP (ref 150–400)
PLATELET # BLD AUTO: 197 K/UL — SIGNIFICANT CHANGE UP (ref 150–400)
POTASSIUM SERPL-MCNC: 4.2 MMOL/L — SIGNIFICANT CHANGE UP (ref 3.5–5.3)
POTASSIUM SERPL-MCNC: 4.2 MMOL/L — SIGNIFICANT CHANGE UP (ref 3.5–5.3)
POTASSIUM SERPL-SCNC: 4.2 MMOL/L — SIGNIFICANT CHANGE UP (ref 3.5–5.3)
POTASSIUM SERPL-SCNC: 4.2 MMOL/L — SIGNIFICANT CHANGE UP (ref 3.5–5.3)
RBC # BLD: 3.96 M/UL — SIGNIFICANT CHANGE UP (ref 3.8–5.2)
RBC # BLD: 3.96 M/UL — SIGNIFICANT CHANGE UP (ref 3.8–5.2)
RBC # FLD: 12.9 % — SIGNIFICANT CHANGE UP (ref 10.3–14.5)
RBC # FLD: 12.9 % — SIGNIFICANT CHANGE UP (ref 10.3–14.5)
SODIUM SERPL-SCNC: 140 MMOL/L — SIGNIFICANT CHANGE UP (ref 135–145)
SODIUM SERPL-SCNC: 140 MMOL/L — SIGNIFICANT CHANGE UP (ref 135–145)
WBC # BLD: 7.27 K/UL — SIGNIFICANT CHANGE UP (ref 3.8–10.5)
WBC # BLD: 7.27 K/UL — SIGNIFICANT CHANGE UP (ref 3.8–10.5)
WBC # FLD AUTO: 7.27 K/UL — SIGNIFICANT CHANGE UP (ref 3.8–10.5)
WBC # FLD AUTO: 7.27 K/UL — SIGNIFICANT CHANGE UP (ref 3.8–10.5)

## 2023-12-28 PROCEDURE — 93010 ELECTROCARDIOGRAM REPORT: CPT

## 2023-12-28 PROCEDURE — 92960 CARDIOVERSION ELECTRIC EXT: CPT

## 2023-12-28 PROCEDURE — 93010 ELECTROCARDIOGRAM REPORT: CPT | Mod: 77

## 2023-12-28 RX ADMIN — Medication 40 MILLIGRAM(S): at 05:25

## 2023-12-28 RX ADMIN — APIXABAN 5 MILLIGRAM(S): 2.5 TABLET, FILM COATED ORAL at 05:25

## 2023-12-28 RX ADMIN — APIXABAN 5 MILLIGRAM(S): 2.5 TABLET, FILM COATED ORAL at 17:36

## 2023-12-28 RX ADMIN — Medication 80 MILLIGRAM(S): at 15:00

## 2023-12-28 RX ADMIN — Medication 80 MILLIGRAM(S): at 03:30

## 2023-12-28 RX ADMIN — LISINOPRIL 10 MILLIGRAM(S): 2.5 TABLET ORAL at 05:25

## 2023-12-28 NOTE — PROGRESS NOTE ADULT - SUBJECTIVE AND OBJECTIVE BOX
24H hour events: Admitted 12/27 for Sotalol load and DCCV, no acute overnight events     MEDICATIONS:  apixaban 5 milliGRAM(s) Oral every 12 hours  furosemide    Tablet 40 milliGRAM(s) Oral daily  lisinopril 10 milliGRAM(s) Oral daily  sotalol. 80 milliGRAM(s) Oral every 12 hours                  REVIEW OF SYSTEMS:      PHYSICAL EXAM:  T(C): 36.4 (12-28-23 @ 00:07), Max: 37 (12-27-23 @ 13:41)  HR: 102 (12-28-23 @ 00:07) (86 - 114)  BP: 110/78 (12-28-23 @ 00:07) (110/78 - 131/57)  RR: 17 (12-28-23 @ 00:07) (16 - 18)  SpO2: 98% (12-28-23 @ 00:07) (97% - 98%)  Wt(kg): --  I&O's Summary      Appearance: Normal	  HEENT:   Normal oral mucosa, PERRL, EOMI	  Lymphatic: No lymphadenopathy  Cardiovascular: Normal S1 S2, No JVD, No murmurs, No edema  Respiratory: Lungs clear to auscultation	  Psychiatry: A & O x 3, Mood & affect appropriate  Gastrointestinal:  Soft, Non-tender, + BS	  Skin: No rashes, No ecchymoses, No cyanosis	  Neurologic: Non-focal  Extremities: Normal range of motion, No clubbing, cyanosis or edema  Vascular: Peripheral pulses palpable 2+ bilaterally        LABS:	 	    CBC Full  -  ( 28 Dec 2023 01:03 )  WBC Count : 7.27 K/uL  Hemoglobin : 12.0 g/dL  Hematocrit : 35.6 %  Platelet Count - Automated : 197 K/uL  Mean Cell Volume : 89.9 fl  Mean Cell Hemoglobin : 30.3 pg  Mean Cell Hemoglobin Concentration : 33.7 gm/dL  Auto Neutrophil # : x  Auto Lymphocyte # : x  Auto Monocyte # : x  Auto Eosinophil # : x  Auto Basophil # : x  Auto Neutrophil % : x  Auto Lymphocyte % : x  Auto Monocyte % : x  Auto Eosinophil % : x  Auto Basophil % : x    12-28    140  |  107  |  23  ----------------------------<  103<H>  4.2   |  24  |  0.76  12-27    142  |  107  |  25<H>  ----------------------------<  116<H>  4.2   |  26  |  1.00    Ca    8.9      28 Dec 2023 01:03  Ca    9.5      27 Dec 2023 13:55        proBNP:   Lipid Profile:   HgA1c:   TSH:       CARDIAC MARKERS:          TELEMETRY: 	    ECG:  	  RADIOLOGY:  OTHER: 	    PREVIOUS DIAGNOSTIC TESTING:    [ ] Echocardiogram:    [ ]  Catheterization:  [ ] Stress Test:  	  	  ASSESSMENT/PLAN: 	     24H hour events: Admitted 12/27 for Sotalol load and DCCV, no acute overnight events     MEDICATIONS:  apixaban 5 milliGRAM(s) Oral every 12 hours  furosemide    Tablet 40 milliGRAM(s) Oral daily  lisinopril 10 milliGRAM(s) Oral daily  sotalol. 80 milliGRAM(s) Oral every 12 hours                  REVIEW OF SYSTEMS:  Constitutional: denies chills, fatigue  Respiratory: denies cough/wheezing, dyspnea  CV: denies CP/PALMER/Palpitations  GI: denies abdominal pain, n/v/d  Neuro: denies dizziness/HA  Extremities: denies swelling/edema    PHYSICAL EXAM:  T(C): 36.4 (12-28-23 @ 00:07), Max: 37 (12-27-23 @ 13:41)  HR: 102 (12-28-23 @ 00:07) (86 - 114)  BP: 110/78 (12-28-23 @ 00:07) (110/78 - 131/57)  RR: 17 (12-28-23 @ 00:07) (16 - 18)  SpO2: 98% (12-28-23 @ 00:07) (97% - 98%)  Wt(kg): --  I&O's Summary      Appearance: Normal	  Cardiovascular: Normal S1 S2  Respiratory: Lungs clear to auscultation b/l  Psychiatry: A & O x 3, Mood & affect appropriate  Gastrointestinal:  Soft, Non-tender, + BS	  Neurologic: Non-focal  Extremities:  No clubbing, cyanosis or edema        LABS:	 	    CBC Full  -  ( 28 Dec 2023 01:03 )  WBC Count : 7.27 K/uL  Hemoglobin : 12.0 g/dL  Hematocrit : 35.6 %  Platelet Count - Automated : 197 K/uL  Mean Cell Volume : 89.9 fl  Mean Cell Hemoglobin : 30.3 pg  Mean Cell Hemoglobin Concentration : 33.7 gm/dL  Auto Neutrophil # : x  Auto Lymphocyte # : x  Auto Monocyte # : x  Auto Eosinophil # : x  Auto Basophil # : x  Auto Neutrophil % : x  Auto Lymphocyte % : x  Auto Monocyte % : x  Auto Eosinophil % : x  Auto Basophil % : x    12-28    140  |  107  |  23  ----------------------------<  103<H>  4.2   |  24  |  0.76  12-27    142  |  107  |  25<H>  ----------------------------<  116<H>  4.2   |  26  |  1.00    Ca    8.9      28 Dec 2023 01:03  Ca    9.5      27 Dec 2023 13:55        TELEMETRY: Aflutter with RVR     ASSESSMENT/PLAN: 	  78 year old female, no reported implantable devices, with PMHx of HTN,  Atrial fibrillation with RVR on Eliquis (last dose taken 12/27/23 am ) hx DCCV/WAGNER last January 2023 and  Dec 8,2023  with Dr. Argueta. Recent echo done in 11/2022 demonstrated normal LV function as well as marked biatrial enlargement. Pt with recent complaints of SOB on exertion and climbing stairs relieved with rest . Now presents for Medical management Sotalol . Admit to Dr. Argueta. Pt currently in Atrial FLutter 4:1     #Aflutter  -Sotalol load 80mg BID with EKG 2 hours post dose  -continue AC with Apixaban 5mg BID  - NPO for planned DCCV today    #HTN  -continue lisinopril, furosemide  -dash diet     #dispo  -home post sotalol load if stable    Sariah Clifford NP  [id number]   24H hour events: Admitted 12/27 for Sotalol load and DCCV, no acute overnight events     MEDICATIONS:  apixaban 5 milliGRAM(s) Oral every 12 hours  furosemide    Tablet 40 milliGRAM(s) Oral daily  lisinopril 10 milliGRAM(s) Oral daily  sotalol. 80 milliGRAM(s) Oral every 12 hours    REVIEW OF SYSTEMS:  Constitutional: denies chills, fatigue  Respiratory: denies cough/wheezing, dyspnea  CV: denies CP/PALMER/Palpitations  GI: denies abdominal pain, n/v/d  Neuro: denies dizziness/HA  Extremities: denies swelling/edema    PHYSICAL EXAM:  T(C): 36.4 (12-28-23 @ 00:07), Max: 37 (12-27-23 @ 13:41)  HR: 102 (12-28-23 @ 00:07) (86 - 114)  BP: 110/78 (12-28-23 @ 00:07) (110/78 - 131/57)  RR: 17 (12-28-23 @ 00:07) (16 - 18)  SpO2: 98% (12-28-23 @ 00:07) (97% - 98%)    Appearance: Normal	  Cardiovascular: Normal S1 S2  Respiratory: Lungs clear to auscultation b/l  Psychiatry: A & O x 3, Mood & affect appropriate  Gastrointestinal:  Soft, Non-tender, + BS	  Neurologic: Non-focal  Extremities:  No clubbing, cyanosis or edema    LABS:	 	    CBC Full  -  ( 28 Dec 2023 01:03 )  WBC Count : 7.27 K/uL  Hemoglobin : 12.0 g/dL  Hematocrit : 35.6 %  Platelet Count - Automated : 197 K/uL  Mean Cell Volume : 89.9 fl  Mean Cell Hemoglobin : 30.3 pg  Mean Cell Hemoglobin Concentration : 33.7 gm/dL    12-28    140  |  107  |  23  ----------------------------<  103<H>  4.2   |  24  |  0.76  12-27    142  |  107  |  25<H>  ----------------------------<  116<H>  4.2   |  26  |  1.00    Ca    8.9      28 Dec 2023 01:03  Ca    9.5      27 Dec 2023 13:55      TELEMETRY: Aflutter with RVR     ASSESSMENT/PLAN: 	  78 year old female, no reported implantable devices, with PMHx of HTN,  Atrial fibrillation with RVR on Eliquis (last dose taken 12/27/23 am ) hx DCCV/WAGNER last January 2023 and  Dec 8,2023  with Dr. Argueta. Recent echo done in 11/2022 demonstrated normal LV function as well as marked biatrial enlargement. Pt with recent complaints of SOB on exertion and climbing stairs relieved with rest . Now presents for Medical management Sotalol . Admit to Dr. Argueta. Pt currently in Atrial FLutter 4:1     #Aflutter  -Sotalol load 80mg BID with EKG 2 hours post dose  -continue AC with Apixaban 5mg BID  - NPO for planned DCCV today    #HTN  -continue lisinopril, furosemide  -dash diet     #dispo  -home post sotalol load if stable    Sariah Clifford NP  [phone]

## 2023-12-29 ENCOUNTER — TRANSCRIPTION ENCOUNTER (OUTPATIENT)
Age: 78
End: 2023-12-29

## 2023-12-29 VITALS
TEMPERATURE: 98 F | OXYGEN SATURATION: 94 % | HEART RATE: 66 BPM | SYSTOLIC BLOOD PRESSURE: 124 MMHG | DIASTOLIC BLOOD PRESSURE: 58 MMHG | RESPIRATION RATE: 16 BRPM

## 2023-12-29 PROCEDURE — 86901 BLOOD TYPING SEROLOGIC RH(D): CPT

## 2023-12-29 PROCEDURE — 80048 BASIC METABOLIC PNL TOTAL CA: CPT

## 2023-12-29 PROCEDURE — 86900 BLOOD TYPING SEROLOGIC ABO: CPT

## 2023-12-29 PROCEDURE — 99233 SBSQ HOSP IP/OBS HIGH 50: CPT

## 2023-12-29 PROCEDURE — 86850 RBC ANTIBODY SCREEN: CPT

## 2023-12-29 PROCEDURE — 92960 CARDIOVERSION ELECTRIC EXT: CPT

## 2023-12-29 PROCEDURE — 93010 ELECTROCARDIOGRAM REPORT: CPT

## 2023-12-29 PROCEDURE — 85027 COMPLETE CBC AUTOMATED: CPT

## 2023-12-29 PROCEDURE — 93005 ELECTROCARDIOGRAM TRACING: CPT

## 2023-12-29 PROCEDURE — 93010 ELECTROCARDIOGRAM REPORT: CPT | Mod: 77

## 2023-12-29 RX ORDER — METOPROLOL TARTRATE 50 MG
1 TABLET ORAL
Refills: 0 | DISCHARGE

## 2023-12-29 RX ORDER — SOTALOL HCL 120 MG
1 TABLET ORAL
Qty: 180 | Refills: 2
Start: 2023-12-29 | End: 2024-09-23

## 2023-12-29 RX ADMIN — Medication 80 MILLIGRAM(S): at 13:32

## 2023-12-29 RX ADMIN — Medication 40 MILLIGRAM(S): at 05:40

## 2023-12-29 RX ADMIN — LISINOPRIL 10 MILLIGRAM(S): 2.5 TABLET ORAL at 05:40

## 2023-12-29 RX ADMIN — APIXABAN 5 MILLIGRAM(S): 2.5 TABLET, FILM COATED ORAL at 05:41

## 2023-12-29 RX ADMIN — Medication 80 MILLIGRAM(S): at 02:28

## 2023-12-29 NOTE — PROGRESS NOTE ADULT - NS ATTEND AMEND GEN_ALL_CORE FT
Patient seen at bedside in CRS this morning following DCCV and again this afternoon. I personally reviewed her ECGs post her 2nd and 3rd doses of Sotalol. Continue with current dose. Hold B-blocker, continue with a/c. Continue to monitor QTc 2 hours after each of the first 5 doses of Sotalol. Outpatient follow-up with Dr. Argueta.    FRANSISCO Gilmore
Patient seen at bedside this morning and again this afternoon. I personally reviewed her ECGs following her 4th and 5th doses of Sotalol. QTc acceptable. I counselled the patient on avoiding QT prolonging medications. She is having a high burden of APCs. I encouraged her to follow-up as an outpatient with Dr. Argueta to discuss a catheter ablation. No further workup as an inpatient. Do not resume home dose of B-blocker at the time of discharge.    FRANSISCO Gilmore

## 2023-12-29 NOTE — PROGRESS NOTE ADULT - ASSESSMENT
78F with PMH of HTN, recent diagnosis with Afib s/p WAGNER/DCCV is now s/p repeat DCCV and being loaded with Sotalol    # Afib   - s/p successful DCCV on 12/28/23 with Dr. Gilmore, now converted to NSR   - Continue Eliquis 5mg BID   - Hold BB    - QTc remains stable, last QTc 458, reviewed by Dr. Gilmore   - Continue Sotalol 80mg BID, next dose to be given around 4AM, followed by EKG 2 hours post administration of 4th dose of Sotalol in AM    - Keep Potassium> 4.0 and Magnesium>2.0     # HTN   - Normotensive   - Continue Lisinopril     - DASH diet    # Dispo   - VTE: Eliquis 5mg BID    - Likely D/C after 5th dose of Sotalol if QTc remains stable   - Patient to follow up with Dr. Argueta after discharge       78F with PMH of HTN, recent diagnosis with Afib s/p WAGNER/DCCV is now s/p repeat DCCV and being loaded with Sotalol    # Afib   - s/p successful DCCV on 12/28/23 with Dr. Gilmore, now converted to NSR   - Continue Eliquis 5mg BID   - Hold BB    - QTc remains stable, last QTc 458, reviewed by Dr. Gilmore   - Continue Sotalol 80mg BID, next dose to be given around 3AM, followed by EKG 2 hours post administration of 4th dose of Sotalol in AM    - Keep Potassium> 4.0 and Magnesium>2.0     # HTN   - Normotensive   - Continue Lisinopril     - DASH diet    # Dispo   - VTE: Eliquis 5mg BID    - Likely D/C after 5th dose of Sotalol if QTc remains stable   - Patient to follow up with Dr. Argueta after discharge

## 2023-12-29 NOTE — DISCHARGE NOTE PROVIDER - NSDCFUADDAPPT_GEN_ALL_CORE_FT
Follow up with your cardiologist and primary care provider in 1-2 weeks.   Call [phone] to make followup appt with Dr. Argueta Follow up with your cardiologist and primary care provider in 1-2 weeks.   Call [phone] to make followup appt with Dr. Argueta

## 2023-12-29 NOTE — DISCHARGE NOTE NURSING/CASE MANAGEMENT/SOCIAL WORK - PATIENT PORTAL LINK FT
You can access the FollowMyHealth Patient Portal offered by [facility] by registering at the following website: [email or web address]. By joining [email or web address]’s FollowMyHealth portal, you will also be able to view your health information using other applications (apps) compatible with our system. You can access the FollowMyHealth Patient Portal offered by [facility] by registering at the following website: [email or web address]. By joining [employer]’s FollowMyHealth portal, you will also be able to view your health information using other applications (apps) compatible with our system.

## 2023-12-29 NOTE — DISCHARGE NOTE PROVIDER - NSDCMRMEDTOKEN_GEN_ALL_CORE_FT
anastrozole 1 mg oral tablet: 1 tab(s) orally once a day  apixaban 5 mg oral tablet: 1 tab(s) orally every 12 hours Last dose 12/27/23 am dose  Lasix 40 mg oral tablet: 1 tab(s) orally once a day   lisinopril 10 mg oral tablet: 1 tab(s) orally once a day   metoprolol succinate 100 mg oral tablet, extended release: 1 tab(s) orally once a day  sotalol 80 mg oral tablet: 1 tab(s) orally every 12 hours   anastrozole 1 mg oral tablet: 1 tab(s) orally once a day  apixaban 5 mg oral tablet: 1 tab(s) orally every 12 hours Last dose 12/27/23 am dose  Lasix 40 mg oral tablet: 1 tab(s) orally once a day   lisinopril 10 mg oral tablet: 1 tab(s) orally once a day   sotalol 80 mg oral tablet: 1 tab(s) orally every 12 hours

## 2023-12-29 NOTE — DISCHARGE NOTE PROVIDER - PROVIDER TOKENS
PROVIDER:[TOKEN:[[id number]],FOLLOWUP:[Routine],ESTABLISHEDPATIENT:[T]] PROVIDER:[TOKEN:[[id number]],FOLLOWUP:[Routine],ESTABLISHEDPATIENT:[T]]

## 2023-12-29 NOTE — DISCHARGE NOTE PROVIDER - NSDCCPCAREPLAN_GEN_ALL_CORE_FT
PRINCIPAL DISCHARGE DIAGNOSIS  Diagnosis: Atrial fibrillation  Assessment and Plan of Treatment: Your heart rate and rhythm will be controlled.   Continue with your cardiologist and primary care MD. Continue your current medications. Call your physician for palpitations, feelings of rapid heart beat, lightheadedness, or dizziness. If you are on warfarin (Coumadin), have your blood work drawn as instructed.

## 2023-12-29 NOTE — DISCHARGE NOTE PROVIDER - HOSPITAL COURSE
HPI:  This is a 78 year old female, no reported implantable devices, with PMHx of HTN, recently diagnosed Atrial fibrillation with RVR on Eliquis (last dose taken 12/27/23 am ) hx DCCV/WAGNER last January 2023 and  Dec 8,2023  with Dr. Argueta. Recent echo done in 11/2022 demonstrated normal LV function as well as marked biatrial enlargement. Pt with recent complaints of SOB on exertion and climbing stairs relieved with rest . Now presents for Medical management Sotalol . Admit to Dr. Argueta. Pt currently in Atrial FLutter 4:1 .  Patient currently denies chest pain, palpitations, SOB, dizziness.        HPI:  This is a 78 year old female, no reported implantable devices, with PMHx of HTN, recently diagnosed Atrial fibrillation with RVR on Eliquis (last dose taken 12/27/23 am ) hx DCCV/WAGNER last January 2023 and  Dec 8,2023 with Dr. Argueta. Recent echo done in 11/2022 demonstrated normal LV function as well as marked biatrial enlargement. Pt with recent complaints of SOB on exertion and climbing stairs relieved with rest . Now presents for Medical management Sotalol. Pt currently in atypical atrial flutter.  Patient currently denies chest pain, palpitations, SOB, dizziness.    Underwent Sotalol load and DCCV. Home dose of B-blocker stopped    Outpatient follow-up with Dr. Argueta.

## 2023-12-29 NOTE — PROGRESS NOTE ADULT - SUBJECTIVE AND OBJECTIVE BOX
Electrophysiology ACP Progress Note:                Subjective:   Patient feels well- no current complaints- Denies chest pain, shortness of breath. Walked around the unit without issues         MEDICATIONS  (STANDING):  apixaban 5 milliGRAM(s) Oral every 12 hours  furosemide    Tablet 40 milliGRAM(s) Oral daily  lisinopril 10 milliGRAM(s) Oral daily  sotalol. 80 milliGRAM(s) Oral every 12 hours    MEDICATIONS  (PRN):      Objective:  Vital Signs Last 24 Hrs  T(C): 36.6 (29 Dec 2023 00:10), Max: 36.9 (28 Dec 2023 20:26)  T(F): 97.9 (29 Dec 2023 00:10), Max: 98.4 (28 Dec 2023 20:26)  HR: 74 (29 Dec 2023 00:10) (56 - 100)  BP: 100/54 (29 Dec 2023 00:10) (100/54 - 137/71)  BP(mean): 74 (29 Dec 2023 00:10) (74 - 107)  RR: 17 (29 Dec 2023 00:10) (16 - 17)  SpO2: 98% (29 Dec 2023 00:10) (95% - 98%)    Parameters below as of 29 Dec 2023 00:10  Patient On (Oxygen Delivery Method): room air        12-28-23 @ 07:01  -  12-29-23 @ 00:40  --------------------------------------------------------  IN: 480 mL / OUT: 0 mL / NET: 480 mL                              12.0   7.27  )-----------( 197      ( 28 Dec 2023 01:03 )             35.6     12-28    140  |  107  |  23  ----------------------------<  103<H>  4.2   |  24  |  0.76    Ca    8.9      28 Dec 2023 01:03        Urinalysis Basic - ( 28 Dec 2023 01:03 )    Color: x / Appearance: x / SG: x / pH: x  Gluc: 103 mg/dL / Ketone: x  / Bili: x / Urobili: x   Blood: x / Protein: x / Nitrite: x   Leuk Esterase: x / RBC: x / WBC x   Sq Epi: x / Non Sq Epi: x / Bacteria: x      Physical Exam:  No apparent distress, alert and oriented times three, appropriate affect  JVD is not elevated, supple  Clear to auscultation with no wheezing, rhonchi or crackles  Regular rate and rhythm with no murmur, rub or gallop  Soft, non-tender, non-distended, no masses/guarding or rebound tenderness                                   Electrophysiology ACP Progress Note:                Subjective:   Patient feels well- no current complaints- Denies chest pain, shortness of breath. Walked around the unit without issues       MEDICATIONS  (STANDING):  apixaban 5 milliGRAM(s) Oral every 12 hours  furosemide    Tablet 40 milliGRAM(s) Oral daily  lisinopril 10 milliGRAM(s) Oral daily  sotalol. 80 milliGRAM(s) Oral every 12 hours    MEDICATIONS  (PRN):      Objective:  Vital Signs Last 24 Hrs  T(C): 36.6 (29 Dec 2023 00:10), Max: 36.9 (28 Dec 2023 20:26)  T(F): 97.9 (29 Dec 2023 00:10), Max: 98.4 (28 Dec 2023 20:26)  HR: 74 (29 Dec 2023 00:10) (56 - 100)  BP: 100/54 (29 Dec 2023 00:10) (100/54 - 137/71)  BP(mean): 74 (29 Dec 2023 00:10) (74 - 107)  RR: 17 (29 Dec 2023 00:10) (16 - 17)  SpO2: 98% (29 Dec 2023 00:10) (95% - 98%)    Parameters below as of 29 Dec 2023 00:10  Patient On (Oxygen Delivery Method): room air        12-28-23 @ 07:01  -  12-29-23 @ 00:40  --------------------------------------------------------  IN: 480 mL / OUT: 0 mL / NET: 480 mL                              12.0   7.27  )-----------( 197      ( 28 Dec 2023 01:03 )             35.6     12-28    140  |  107  |  23  ----------------------------<  103<H>  4.2   |  24  |  0.76    Ca    8.9      28 Dec 2023 01:03        Urinalysis Basic - ( 28 Dec 2023 01:03 )    Color: x / Appearance: x / SG: x / pH: x  Gluc: 103 mg/dL / Ketone: x  / Bili: x / Urobili: x   Blood: x / Protein: x / Nitrite: x   Leuk Esterase: x / RBC: x / WBC x   Sq Epi: x / Non Sq Epi: x / Bacteria: x      Physical Exam:  No apparent distress, alert and oriented times three, appropriate affect  JVD is not elevated, supple  Clear to auscultation with no wheezing, rhonchi or crackles  Regular rate and rhythm with no murmur, rub or gallop  Soft, non-tender, non-distended, no masses/guarding or rebound tenderness

## 2023-12-29 NOTE — DISCHARGE NOTE PROVIDER - CARE PROVIDERS DIRECT ADDRESSES
[email or web address]

## 2023-12-29 NOTE — DISCHARGE NOTE PROVIDER - CARE PROVIDER_API CALL
Aki Argueta  Cardiac Electrophysiology  [address] [place] [address]  Phone: [phone]  Fax: [phone]  Established Patient  Follow Up Time: Routine   Aki Argueta  Cardiac Electrophysiology  [address] [place] [address]  Phone: [phone]  Fax: [phone]  Established Patient  Follow Up Time: Routine

## 2023-12-29 NOTE — DISCHARGE NOTE PROVIDER - NSDCFUSCHEDAPPT_GEN_ALL_CORE_FT
Mehdi Bacon  [facility] Physician [facility]  DERM [address]  Scheduled Appointment: 01/23/2024    Francisco Todd  [facility] Physician [facility]  CARDIOLOGY [address]  Scheduled Appointment: 03/07/2024     Mehdi Bacon  [facility] Physician [facility]  DERM [address]  Scheduled Appointment: 01/23/2024    Francisco Todd  [facility] Physician [facility]  CARDIOLOGY [address]  Scheduled Appointment: 03/07/2024

## 2024-01-11 ENCOUNTER — NON-APPOINTMENT (OUTPATIENT)
Age: 79
End: 2024-01-11

## 2024-01-11 ENCOUNTER — APPOINTMENT (OUTPATIENT)
Dept: CARDIOLOGY | Facility: CLINIC | Age: 79
End: 2024-01-11
Payer: MEDICARE

## 2024-01-11 VITALS
DIASTOLIC BLOOD PRESSURE: 75 MMHG | TEMPERATURE: 93.4 F | WEIGHT: 201 LBS | BODY MASS INDEX: 34.31 KG/M2 | HEIGHT: 64 IN | RESPIRATION RATE: 19 BRPM | HEART RATE: 65 BPM | SYSTOLIC BLOOD PRESSURE: 134 MMHG | OXYGEN SATURATION: 96 %

## 2024-01-11 PROCEDURE — 93000 ELECTROCARDIOGRAM COMPLETE: CPT

## 2024-01-11 PROCEDURE — 99214 OFFICE O/P EST MOD 30 MIN: CPT

## 2024-01-11 RX ORDER — SOTALOL HYDROCHLORIDE TABLES AF 80 MG/1
80 TABLET ORAL
Refills: 0 | Status: ACTIVE | COMMUNITY

## 2024-01-11 RX ORDER — METOPROLOL SUCCINATE 100 MG/1
100 TABLET, EXTENDED RELEASE ORAL
Qty: 90 | Refills: 3 | Status: DISCONTINUED | COMMUNITY
Start: 2023-06-01 | End: 2024-01-11

## 2024-01-11 NOTE — REASON FOR VISIT
[Arrhythmia/ECG Abnorrmalities] : arrhythmia/ECG abnormalities [Spouse] : spouse [FreeTextEntry3] : Rolo Ruiz

## 2024-01-11 NOTE — CARDIOLOGY SUMMARY
[de-identified] : December 8, 2022 AF moderate rate January 5, 2023 A. fib moderate rate February 24, 2023 sinus with APCs nonspecific ST-T change January 11, 2024 sinus rhythm 9/11/23 sinus 6/1/23 sinus apcs [de-identified] : November 2022 normal LV systolic function Marked biatrial enlargement January 2023 WAGNER normal LV function December 2023 WAGNER normal LV function simple aortic atheroma [de-identified] : December 2022 minimal coronary atherosclerosis no stenosis calcium score of 1 [de-identified] : Cardioversion 2023

## 2024-01-11 NOTE — HISTORY OF PRESENT ILLNESS
[FreeTextEntry1] : Status post cardioversion.  WAGNER with normal LV function no thrombus.  She feels well without chest pain dyspnea palpitations or edema.  Now on sotalol

## 2024-01-11 NOTE — ASSESSMENT
[FreeTextEntry1] : Status post cardioversion for recurrent paroxysmal AF, patient now on Antiarrhythmic drug therapy. APCs.  Overweight.

## 2024-01-11 NOTE — DISCUSSION/SUMMARY
[Patient] : the patient [Risks] : risks [Benefits] : benefits [Alternatives] : alternatives [___ Month(s)] : in [unfilled] month(s) [FreeTextEntry1] :   Weight loss maintain oral anticoagulation with Eliquis and AAD.  Lasix will be as needed.  Discussed atrial premature beats..  Increase activity as tolerated.  Risk of recurrent A-fib discussed and questions addressed. Periodic EKG regarding QTc [EKG obtained to assist in diagnosis and management of assessed problem(s)] : EKG obtained to assist in diagnosis and management of assessed problem(s)

## 2024-01-17 ENCOUNTER — APPOINTMENT (OUTPATIENT)
Dept: DERMATOLOGY | Facility: CLINIC | Age: 79
End: 2024-01-17

## 2024-01-23 ENCOUNTER — APPOINTMENT (OUTPATIENT)
Dept: DERMATOLOGY | Facility: CLINIC | Age: 79
End: 2024-01-23

## 2024-03-21 ENCOUNTER — EMERGENCY (EMERGENCY)
Facility: HOSPITAL | Age: 79
LOS: 1 days | Discharge: HOME | End: 2024-03-21
Attending: INTERNAL MEDICINE | Admitting: INTERNAL MEDICINE
Payer: MEDICARE

## 2024-03-21 VITALS
HEART RATE: 78 BPM | OXYGEN SATURATION: 95 % | SYSTOLIC BLOOD PRESSURE: 118 MMHG | DIASTOLIC BLOOD PRESSURE: 72 MMHG | RESPIRATION RATE: 16 BRPM

## 2024-03-21 VITALS
RESPIRATION RATE: 18 BRPM | DIASTOLIC BLOOD PRESSURE: 69 MMHG | HEIGHT: 64 IN | WEIGHT: 205.03 LBS | OXYGEN SATURATION: 97 % | SYSTOLIC BLOOD PRESSURE: 97 MMHG | HEART RATE: 112 BPM | TEMPERATURE: 98 F

## 2024-03-21 DIAGNOSIS — Z96.659 PRESENCE OF UNSPECIFIED ARTIFICIAL KNEE JOINT: Chronic | ICD-10-CM

## 2024-03-21 DIAGNOSIS — Z90.12 ACQUIRED ABSENCE OF LEFT BREAST AND NIPPLE: Chronic | ICD-10-CM

## 2024-03-21 LAB
ALBUMIN SERPL ELPH-MCNC: 3.4 G/DL — SIGNIFICANT CHANGE UP (ref 3.3–5)
ALP SERPL-CCNC: 60 U/L — SIGNIFICANT CHANGE UP (ref 40–120)
ALT FLD-CCNC: 14 U/L — SIGNIFICANT CHANGE UP (ref 10–45)
ANION GAP SERPL CALC-SCNC: 11 MMOL/L — SIGNIFICANT CHANGE UP (ref 5–17)
AST SERPL-CCNC: 21 U/L — SIGNIFICANT CHANGE UP (ref 10–40)
BASOPHILS # BLD AUTO: 0.05 K/UL — SIGNIFICANT CHANGE UP (ref 0–0.2)
BASOPHILS NFR BLD AUTO: 0.4 % — SIGNIFICANT CHANGE UP (ref 0–2)
BILIRUB SERPL-MCNC: 0.7 MG/DL — SIGNIFICANT CHANGE UP (ref 0.2–1.2)
BUN SERPL-MCNC: 21 MG/DL — SIGNIFICANT CHANGE UP (ref 7–23)
CALCIUM SERPL-MCNC: 9.2 MG/DL — SIGNIFICANT CHANGE UP (ref 8.4–10.5)
CHLORIDE SERPL-SCNC: 105 MMOL/L — SIGNIFICANT CHANGE UP (ref 96–108)
CO2 SERPL-SCNC: 24 MMOL/L — SIGNIFICANT CHANGE UP (ref 22–31)
CREAT SERPL-MCNC: 0.99 MG/DL — SIGNIFICANT CHANGE UP (ref 0.5–1.3)
EGFR: 58 ML/MIN/1.73M2 — LOW
EOSINOPHIL # BLD AUTO: 0.13 K/UL — SIGNIFICANT CHANGE UP (ref 0–0.5)
EOSINOPHIL NFR BLD AUTO: 1.1 % — SIGNIFICANT CHANGE UP (ref 0–6)
GLUCOSE SERPL-MCNC: 113 MG/DL — HIGH (ref 70–99)
HCT VFR BLD CALC: 38.9 % — SIGNIFICANT CHANGE UP (ref 34.5–45)
HGB BLD-MCNC: 13.6 G/DL — SIGNIFICANT CHANGE UP (ref 11.5–15.5)
IMM GRANULOCYTES NFR BLD AUTO: 0.4 % — SIGNIFICANT CHANGE UP (ref 0–0.9)
INR BLD: 1.36 RATIO — HIGH (ref 0.85–1.18)
LYMPHOCYTES # BLD AUTO: 1.46 K/UL — SIGNIFICANT CHANGE UP (ref 1–3.3)
LYMPHOCYTES # BLD AUTO: 12.6 % — LOW (ref 13–44)
MAGNESIUM SERPL-MCNC: 1.7 MG/DL — SIGNIFICANT CHANGE UP (ref 1.6–2.6)
MCHC RBC-ENTMCNC: 30.6 PG — SIGNIFICANT CHANGE UP (ref 27–34)
MCHC RBC-ENTMCNC: 35 GM/DL — SIGNIFICANT CHANGE UP (ref 32–36)
MCV RBC AUTO: 87.6 FL — SIGNIFICANT CHANGE UP (ref 80–100)
MONOCYTES # BLD AUTO: 0.97 K/UL — HIGH (ref 0–0.9)
MONOCYTES NFR BLD AUTO: 8.3 % — SIGNIFICANT CHANGE UP (ref 2–14)
NEUTROPHILS # BLD AUTO: 8.97 K/UL — HIGH (ref 1.8–7.4)
NEUTROPHILS NFR BLD AUTO: 77.2 % — HIGH (ref 43–77)
NRBC # BLD: 0 /100 WBCS — SIGNIFICANT CHANGE UP (ref 0–0)
PLATELET # BLD AUTO: 235 K/UL — SIGNIFICANT CHANGE UP (ref 150–400)
POTASSIUM SERPL-MCNC: 4.2 MMOL/L — SIGNIFICANT CHANGE UP (ref 3.5–5.3)
POTASSIUM SERPL-SCNC: 4.2 MMOL/L — SIGNIFICANT CHANGE UP (ref 3.5–5.3)
PROT SERPL-MCNC: 7 G/DL — SIGNIFICANT CHANGE UP (ref 6–8.3)
PROTHROM AB SERPL-ACNC: 15.4 SEC — HIGH (ref 9.5–13)
RBC # BLD: 4.44 M/UL — SIGNIFICANT CHANGE UP (ref 3.8–5.2)
RBC # FLD: 13.4 % — SIGNIFICANT CHANGE UP (ref 10.3–14.5)
SODIUM SERPL-SCNC: 140 MMOL/L — SIGNIFICANT CHANGE UP (ref 135–145)
TROPONIN I, HIGH SENSITIVITY RESULT: 12.4 NG/L — SIGNIFICANT CHANGE UP
WBC # BLD: 11.63 K/UL — HIGH (ref 3.8–10.5)
WBC # FLD AUTO: 11.63 K/UL — HIGH (ref 3.8–10.5)

## 2024-03-21 PROCEDURE — 85025 COMPLETE CBC W/AUTO DIFF WBC: CPT

## 2024-03-21 PROCEDURE — 99285 EMERGENCY DEPT VISIT HI MDM: CPT

## 2024-03-21 PROCEDURE — 93005 ELECTROCARDIOGRAM TRACING: CPT

## 2024-03-21 PROCEDURE — 83735 ASSAY OF MAGNESIUM: CPT

## 2024-03-21 PROCEDURE — 96374 THER/PROPH/DIAG INJ IV PUSH: CPT

## 2024-03-21 PROCEDURE — 36415 COLL VENOUS BLD VENIPUNCTURE: CPT

## 2024-03-21 PROCEDURE — 84484 ASSAY OF TROPONIN QUANT: CPT

## 2024-03-21 PROCEDURE — 99284 EMERGENCY DEPT VISIT MOD MDM: CPT | Mod: 25

## 2024-03-21 PROCEDURE — 80053 COMPREHEN METABOLIC PANEL: CPT

## 2024-03-21 PROCEDURE — 93010 ELECTROCARDIOGRAM REPORT: CPT

## 2024-03-21 PROCEDURE — 85610 PROTHROMBIN TIME: CPT

## 2024-03-21 RX ORDER — DILTIAZEM HCL 120 MG
10 CAPSULE, EXT RELEASE 24 HR ORAL ONCE
Refills: 0 | Status: COMPLETED | OUTPATIENT
Start: 2024-03-21 | End: 2024-03-21

## 2024-03-21 RX ORDER — DILTIAZEM HCL 120 MG
1 CAPSULE, EXT RELEASE 24 HR ORAL
Qty: 90 | Refills: 0
Start: 2024-03-21 | End: 2024-04-19

## 2024-03-21 RX ORDER — SODIUM CHLORIDE 9 MG/ML
500 INJECTION INTRAMUSCULAR; INTRAVENOUS; SUBCUTANEOUS ONCE
Refills: 0 | Status: COMPLETED | OUTPATIENT
Start: 2024-03-21 | End: 2024-03-21

## 2024-03-21 RX ORDER — DILTIAZEM HCL 120 MG
60 CAPSULE, EXT RELEASE 24 HR ORAL ONCE
Refills: 0 | Status: COMPLETED | OUTPATIENT
Start: 2024-03-21 | End: 2024-03-21

## 2024-03-21 RX ADMIN — Medication 60 MILLIGRAM(S): at 15:35

## 2024-03-21 RX ADMIN — SODIUM CHLORIDE 1000 MILLILITER(S): 9 INJECTION INTRAMUSCULAR; INTRAVENOUS; SUBCUTANEOUS at 15:35

## 2024-03-21 RX ADMIN — Medication 10 MILLIGRAM(S): at 15:31

## 2024-03-21 NOTE — ED PROVIDER NOTE - OBJECTIVE STATEMENT
79-year-old female past history of atrial fibrillation came to the emergency room with chief complaint of rapid heart rate as detected by the patient's iWatch patient has a known history of rapid A-fib intermittently patient is on sotalol as prescribed by the cardiologist patient's cardiologist recommended the patient to come to the emergency room Dr. Todd

## 2024-03-21 NOTE — ED PROVIDER NOTE - CLINICAL SUMMARY MEDICAL DECISION MAKING FREE TEXT BOX
79-year-old female past history of atrial fibrillation came to the emergency room with chief complaint of rapid heart rate as detected by the patient's iWatch patient has a known history of rapid A-fib intermittently patient is on sotalol as prescribed by the cardiologist patient's cardiologist recommended the patient to come to the emergency room Dr. Todd  Patient was treated with Cardizem 10 mg IV push and 60 mg orally heart rate is normal blood pressure is normal patient's lab work CBC CMP and troponins within normal limits patient seen by Dr. Todd recommended to discharge the patient with Cardizem and will follow-up outpatient patient will continue sotalol

## 2024-03-21 NOTE — ED ADULT NURSE NOTE - OBJECTIVE STATEMENT
79 year old Female, PMH: Afib on Eliquis, left sided lumpectomy/ mastectomy - do not use extremity band on, breast CA, HTN comes to the ER after being notified by apple watch that she is in Afib. Patient denies chest pain, shortness of breath, palpitations. Patient is alert and oriented, breathing spontaneous and unlabored, rapid Afib on cardiac monitor, moving all extremities, ambulatory with steady gait noted. Patient reports missing a dose of "heart medication" last week, unsure if that caused a change in her rhythm.  at bedside, call bell at bedside, educated to call for assistance.

## 2024-03-21 NOTE — ED PROVIDER NOTE - PATIENT PORTAL LINK FT
You can access the FollowMyHealth Patient Portal offered by [facility] by registering at the following website: [email or web address]. By joining [employer]’s FollowMyHealth portal, you will also be able to view your health information using other applications (apps) compatible with our system.

## 2024-03-21 NOTE — ED ADULT NURSE NOTE - NSFALLUNIVINTERV_ED_ALL_ED
Bed/Stretcher in lowest position, wheels locked, appropriate side rails in place/Call bell, personal items and telephone in reach/Instruct patient to call for assistance before getting out of bed/chair/stretcher/Non-slip footwear applied when patient is off stretcher/[place] to call system/Physically safe environment - no spills, clutter or unnecessary equipment/Purposeful proactive rounding/Room/bathroom lighting operational, light cord in reach

## 2024-03-21 NOTE — ED PROVIDER NOTE - NSFOLLOWUPINSTRUCTIONS_ED_ALL_ED_FT
Follow up with your PMD within 1-2 days.  Rest, increase your fluids, advance your activity as tolerated.   Take all of your other medications as previously prescribed.   Worsening, continued or ANY new concerning symptoms return to the emergency department.  Add Cardizem 30 mg 3 times a day to control the heart rate

## 2024-03-30 ENCOUNTER — RX RENEWAL (OUTPATIENT)
Age: 79
End: 2024-03-30

## 2024-03-30 NOTE — CHART NOTE - NSCHARTNOTEFT_GEN_A_CORE
Pt is a 80 y/o female presented to ED for atrial fibrillation.  As per [facility], pt has scheduled cardiology follow up with Francisco Louis on 4/4/24.

## 2024-04-04 ENCOUNTER — APPOINTMENT (OUTPATIENT)
Dept: CARDIOLOGY | Facility: CLINIC | Age: 79
End: 2024-04-04
Payer: MEDICARE

## 2024-04-04 ENCOUNTER — NON-APPOINTMENT (OUTPATIENT)
Age: 79
End: 2024-04-04

## 2024-04-04 VITALS
HEIGHT: 64 IN | OXYGEN SATURATION: 98 % | BODY MASS INDEX: 35 KG/M2 | DIASTOLIC BLOOD PRESSURE: 80 MMHG | WEIGHT: 205 LBS | SYSTOLIC BLOOD PRESSURE: 123 MMHG | HEART RATE: 107 BPM

## 2024-04-04 PROCEDURE — G2211 COMPLEX E/M VISIT ADD ON: CPT

## 2024-04-04 PROCEDURE — 99214 OFFICE O/P EST MOD 30 MIN: CPT

## 2024-04-04 PROCEDURE — 93000 ELECTROCARDIOGRAM COMPLETE: CPT

## 2024-04-04 RX ORDER — METOPROLOL TARTRATE 50 MG/1
50 TABLET, FILM COATED ORAL
Qty: 60 | Refills: 5 | Status: COMPLETED | COMMUNITY
End: 2024-04-04

## 2024-04-04 NOTE — HISTORY OF PRESENT ILLNESS
[FreeTextEntry1] : Status post cardioversion. Recent ER visit with A-fib flutter given Cardizem with some improvement.  Feels some weakness and tiredness lack of energy.  No chest pain shortness of breath.

## 2024-04-04 NOTE — ASSESSMENT
[FreeTextEntry1] : Status post cardioversion for recurrent paroxysmal AF, patient now on Antiarrhythmic drug therapy.  Cardizem now atrial flutter and atrial tachycardia

## 2024-04-04 NOTE — CARDIOLOGY SUMMARY
[de-identified] : December 8, 2022 AF moderate rate January 5, 2023 A. fib moderate rate February 24, 2023 sinus with APCs nonspecific ST-T change March 2020 for atrial flutter variable rate April 4, 2024 atrial tachycardia 2-1 block January 11, 2024 sinus rhythm 9/11/23 sinus 6/1/23 sinus apcs [de-identified] : November 2022 normal LV systolic function Marked biatrial enlargement January 2023 WAGNER normal LV function December 2023 WAGNER normal LV function simple aortic atheroma [de-identified] : December 2022 minimal coronary atherosclerosis no stenosis calcium score of 1 [de-identified] : Cardioversion 2023

## 2024-04-04 NOTE — DISCUSSION/SUMMARY
[Patient] : the patient [Risks] : risks [Benefits] : benefits [Alternatives] : alternatives [___ Month(s)] : in [unfilled] month(s) [FreeTextEntry1] : Increase Cardizem to 240 mg EP follow-up and possible ablation discussed versus cardioversion.  Continue sotalol discussed with patient and . [EKG obtained to assist in diagnosis and management of assessed problem(s)] : EKG obtained to assist in diagnosis and management of assessed problem(s)

## 2024-04-22 ENCOUNTER — NON-APPOINTMENT (OUTPATIENT)
Age: 79
End: 2024-04-22

## 2024-04-22 ENCOUNTER — APPOINTMENT (OUTPATIENT)
Dept: ELECTROPHYSIOLOGY | Facility: CLINIC | Age: 79
End: 2024-04-22
Payer: MEDICARE

## 2024-04-22 VITALS
SYSTOLIC BLOOD PRESSURE: 116 MMHG | WEIGHT: 205 LBS | RESPIRATION RATE: 19 BRPM | OXYGEN SATURATION: 99 % | HEIGHT: 64 IN | BODY MASS INDEX: 35 KG/M2 | HEART RATE: 103 BPM | DIASTOLIC BLOOD PRESSURE: 74 MMHG

## 2024-04-22 PROCEDURE — 93000 ELECTROCARDIOGRAM COMPLETE: CPT

## 2024-04-22 PROCEDURE — 99215 OFFICE O/P EST HI 40 MIN: CPT

## 2024-04-22 NOTE — HISTORY OF PRESENT ILLNESS
[FreeTextEntry1] : I had the pleasure of seeing Alma Lacey today for consultation for AF management in the arrhythmia clinic at [facility]. As you well know, she is a pleasant 77 year old woman with a history of hypertension, breast cancer status post mastectomy, and persistent atrial fibrillation diagnosed in the setting of pneumonia in 11/22 s/p DCCV. She recurred in 12/23 and was offered ablation but declined and opted instead for a repeat cardioversion and sotalol load. In 3/24 she recurred in an atrial flutter and persisted in the arrhythmia until her visit in cardiology clinic in April, where she was in a 2:1 AT with ventricular rates in the 100s bpm. In arrhythmia she feels no palpitations but has experienced chronic fatigue and worsened exercise tolerance.  An echocardiogram performed in November 2022 demonstrated normal LV function as well as marked biatrial enlargement.

## 2024-04-22 NOTE — DISCUSSION/SUMMARY
[FreeTextEntry1] : In summary, Ms. Lacey is a 79 year old woman with a history of hypertension, breast cancer status post mastectomy, biatrial enlargement, and atrial fibrillation s/p prior cardioversions, now recurring with atypical flutter and AT. Options regarding management, including a rate control strategy with AV reid blocking agents, or rhythm control strategies employing anti-arrhythmic drugs and/or catheter ablation were reviewed. Specifically we discussed transition to amiodarone with a repeat DCCV or a return for catheter ablation. She has opted for the latter course.  The rationale for the procedure as well as its risks--including but not limited to bleeding, vascular injury, pericardial effusion/tamponade, heart block requiring pacemaker, stroke, and death--were reviewed in detail. After consideration of this information, the decision was made to proceed with the procedure.  Ms. Lacey appeared to understand the whole discussion and verbalized that all of her questions were answered to her satisfaction.  Thank you for allowing me to be involved in the care of this pleasant patient. Please feel free to contact me with any questions.   Aki Argueta MD  of Card[place] [address] Office: [phone] Fax: [phone]  '  [EKG obtained to assist in diagnosis and management of assessed problem(s)] : EKG obtained to assist in diagnosis and management of assessed problem(s)

## 2024-04-22 NOTE — CARDIOLOGY SUMMARY
[de-identified] : 4/22/24: Atrial tachycardia with 2:1 conduction and ventricular rate 104 bpm 11/16/23: Sinus rhythm. APCs 2/13/23: Sinus rhythm with frequent APCs 12/21/22: AF with VR 71 bpm [de-identified] : 12/20/2022: Medium sized, mild to moderate defects in the distal anterior, distal anteroseptal, and apical walls that are mostly fixed suggestive of infarct with minimal amrgot-infarct ischemia; medium sized, mild to moderate defect in the basal to mid inferior, basal to mid inferoseptal, and distal inferolateral walls that are fixed suggestive of infarct; LVEF 57%

## 2024-05-01 ENCOUNTER — NON-APPOINTMENT (OUTPATIENT)
Age: 79
End: 2024-05-01

## 2024-05-03 ENCOUNTER — OUTPATIENT (OUTPATIENT)
Dept: OUTPATIENT SERVICES | Facility: HOSPITAL | Age: 79
LOS: 1 days | End: 2024-05-03
Payer: MEDICARE

## 2024-05-03 VITALS
TEMPERATURE: 98 F | RESPIRATION RATE: 12 BRPM | DIASTOLIC BLOOD PRESSURE: 65 MMHG | SYSTOLIC BLOOD PRESSURE: 99 MMHG | WEIGHT: 205.03 LBS | OXYGEN SATURATION: 97 % | HEART RATE: 52 BPM | HEIGHT: 64 IN

## 2024-05-03 DIAGNOSIS — Z92.89 PERSONAL HISTORY OF OTHER MEDICAL TREATMENT: Chronic | ICD-10-CM

## 2024-05-03 DIAGNOSIS — G47.33 OBSTRUCTIVE SLEEP APNEA (ADULT) (PEDIATRIC): ICD-10-CM

## 2024-05-03 DIAGNOSIS — I48.4 ATYPICAL ATRIAL FLUTTER: ICD-10-CM

## 2024-05-03 DIAGNOSIS — Z98.891 HISTORY OF UTERINE SCAR FROM PREVIOUS SURGERY: Chronic | ICD-10-CM

## 2024-05-03 DIAGNOSIS — Z96.659 PRESENCE OF UNSPECIFIED ARTIFICIAL KNEE JOINT: Chronic | ICD-10-CM

## 2024-05-03 DIAGNOSIS — Z90.12 ACQUIRED ABSENCE OF LEFT BREAST AND NIPPLE: Chronic | ICD-10-CM

## 2024-05-03 DIAGNOSIS — Z01.818 ENCOUNTER FOR OTHER PREPROCEDURAL EXAMINATION: ICD-10-CM

## 2024-05-03 LAB
ANION GAP SERPL CALC-SCNC: 12 MMOL/L — SIGNIFICANT CHANGE UP (ref 5–17)
BUN SERPL-MCNC: 26 MG/DL — HIGH (ref 7–23)
CALCIUM SERPL-MCNC: 9.4 MG/DL — SIGNIFICANT CHANGE UP (ref 8.4–10.5)
CHLORIDE SERPL-SCNC: 106 MMOL/L — SIGNIFICANT CHANGE UP (ref 96–108)
CO2 SERPL-SCNC: 22 MMOL/L — SIGNIFICANT CHANGE UP (ref 22–31)
CREAT SERPL-MCNC: 1.06 MG/DL — SIGNIFICANT CHANGE UP (ref 0.5–1.3)
EGFR: 53 ML/MIN/1.73M2 — LOW
GLUCOSE SERPL-MCNC: 88 MG/DL — SIGNIFICANT CHANGE UP (ref 70–99)
HCT VFR BLD CALC: 40.7 % — SIGNIFICANT CHANGE UP (ref 34.5–45)
HGB BLD-MCNC: 13.3 G/DL — SIGNIFICANT CHANGE UP (ref 11.5–15.5)
MCHC RBC-ENTMCNC: 29.9 PG — SIGNIFICANT CHANGE UP (ref 27–34)
MCHC RBC-ENTMCNC: 32.7 GM/DL — SIGNIFICANT CHANGE UP (ref 32–36)
MCV RBC AUTO: 91.5 FL — SIGNIFICANT CHANGE UP (ref 80–100)
NRBC # BLD: 0 /100 WBCS — SIGNIFICANT CHANGE UP (ref 0–0)
PLATELET # BLD AUTO: 215 K/UL — SIGNIFICANT CHANGE UP (ref 150–400)
POTASSIUM SERPL-MCNC: 4.9 MMOL/L — SIGNIFICANT CHANGE UP (ref 3.5–5.3)
POTASSIUM SERPL-SCNC: 4.9 MMOL/L — SIGNIFICANT CHANGE UP (ref 3.5–5.3)
RBC # BLD: 4.45 M/UL — SIGNIFICANT CHANGE UP (ref 3.8–5.2)
RBC # FLD: 13.3 % — SIGNIFICANT CHANGE UP (ref 10.3–14.5)
SODIUM SERPL-SCNC: 140 MMOL/L — SIGNIFICANT CHANGE UP (ref 135–145)
WBC # BLD: 9.68 K/UL — SIGNIFICANT CHANGE UP (ref 3.8–10.5)
WBC # FLD AUTO: 9.68 K/UL — SIGNIFICANT CHANGE UP (ref 3.8–10.5)

## 2024-05-03 PROCEDURE — G0463: CPT

## 2024-05-03 PROCEDURE — 86901 BLOOD TYPING SEROLOGIC RH(D): CPT

## 2024-05-03 PROCEDURE — 86850 RBC ANTIBODY SCREEN: CPT

## 2024-05-03 PROCEDURE — 85027 COMPLETE CBC AUTOMATED: CPT

## 2024-05-03 PROCEDURE — 80048 BASIC METABOLIC PNL TOTAL CA: CPT

## 2024-05-03 PROCEDURE — 86900 BLOOD TYPING SEROLOGIC ABO: CPT

## 2024-05-03 RX ORDER — ATORVASTATIN CALCIUM 80 MG/1
1 TABLET, FILM COATED ORAL
Refills: 0 | DISCHARGE

## 2024-05-03 RX ORDER — ANASTROZOLE 1 MG/1
1 TABLET ORAL
Qty: 0 | Refills: 0 | DISCHARGE

## 2024-05-03 NOTE — H&P PST ADULT - HISTORY OF PRESENT ILLNESS
79 year old female with PMH of HTN, breast cancer s/p left mastectomy, ADDISON- no treatment, PMR in 2014 treated with steroids, afib s/p prior cardioversion x2, now with recurring a flutter and AT. She presents today to PST prior to scheduled Aflutter Ablation on 5/9/24. Patient denies recent fever, chills, chest pain, SOB, palpitations, or recent exposure to COVID-19.

## 2024-05-03 NOTE — H&P PST ADULT - NSICDXPASTMEDICALHX_GEN_ALL_CORE_FT
PAST MEDICAL HISTORY:  Atrial fibrillation     Atrial flutter     Kidney stones     Mild HTN     ADDISON (obstructive sleep apnea)     PMR (polymyalgia rheumatica)     PNA (pneumonia)     Polymyalgia rheumatica     Pulmonary nodule

## 2024-05-03 NOTE — H&P PST ADULT - ASSESSMENT
Activity: Patient states she can walk 1-2 blocks and up a flight of stairs, but will have SOB at times.    DASI: 5.07    Mallampati: 3    Dental: Denies loose teeth or dentures.

## 2024-05-03 NOTE — H&P PST ADULT - NSICDXPASTSURGICALHX_GEN_ALL_CORE_FT
PAST SURGICAL HISTORY:  History of      History of cardioversion     History of knee replacement bilateral    History of mastectomy, left

## 2024-05-03 NOTE — H&P PST ADULT - PROBLEM SELECTOR PLAN 1
Aflutter Ablation on 5/9/24.  CBC, BMP, and Type&screen done today at PST.   Pre op instructions provided and all questions answered.   Patient instructed to hold Eliquis on DOS, with last dose on 5/8/24 PM, as per EP note.

## 2024-05-09 ENCOUNTER — TRANSCRIPTION ENCOUNTER (OUTPATIENT)
Age: 79
End: 2024-05-09

## 2024-05-09 ENCOUNTER — INPATIENT (INPATIENT)
Facility: HOSPITAL | Age: 79
LOS: 0 days | Discharge: HOME | DRG: 274 | End: 2024-05-09
Attending: INTERNAL MEDICINE | Admitting: INTERNAL MEDICINE
Payer: MEDICARE

## 2024-05-09 VITALS
TEMPERATURE: 98 F | HEART RATE: 103 BPM | SYSTOLIC BLOOD PRESSURE: 134 MMHG | DIASTOLIC BLOOD PRESSURE: 79 MMHG | OXYGEN SATURATION: 99 % | RESPIRATION RATE: 18 BRPM

## 2024-05-09 VITALS
HEART RATE: 81 BPM | TEMPERATURE: 97 F | OXYGEN SATURATION: 97 % | RESPIRATION RATE: 16 BRPM | DIASTOLIC BLOOD PRESSURE: 68 MMHG | SYSTOLIC BLOOD PRESSURE: 136 MMHG

## 2024-05-09 DIAGNOSIS — Z96.659 PRESENCE OF UNSPECIFIED ARTIFICIAL KNEE JOINT: Chronic | ICD-10-CM

## 2024-05-09 DIAGNOSIS — Z90.12 ACQUIRED ABSENCE OF LEFT BREAST AND NIPPLE: Chronic | ICD-10-CM

## 2024-05-09 DIAGNOSIS — I48.4 ATYPICAL ATRIAL FLUTTER: ICD-10-CM

## 2024-05-09 DIAGNOSIS — Z98.891 HISTORY OF UTERINE SCAR FROM PREVIOUS SURGERY: Chronic | ICD-10-CM

## 2024-05-09 DIAGNOSIS — Z92.89 PERSONAL HISTORY OF OTHER MEDICAL TREATMENT: Chronic | ICD-10-CM

## 2024-05-09 PROCEDURE — C1732: CPT

## 2024-05-09 PROCEDURE — 93655 ICAR CATH ABLTJ DSCRT ARRHYT: CPT

## 2024-05-09 PROCEDURE — 93010 ELECTROCARDIOGRAM REPORT: CPT | Mod: 77

## 2024-05-09 PROCEDURE — 93657 TX L/R ATRIAL FIB ADDL: CPT

## 2024-05-09 PROCEDURE — C1894: CPT

## 2024-05-09 PROCEDURE — C1730: CPT

## 2024-05-09 PROCEDURE — C1766: CPT

## 2024-05-09 PROCEDURE — C1889: CPT

## 2024-05-09 PROCEDURE — 93656 COMPRE EP EVAL ABLTJ ATR FIB: CPT

## 2024-05-09 PROCEDURE — 93010 ELECTROCARDIOGRAM REPORT: CPT

## 2024-05-09 PROCEDURE — C1759: CPT

## 2024-05-09 PROCEDURE — C1760: CPT

## 2024-05-09 PROCEDURE — 93005 ELECTROCARDIOGRAM TRACING: CPT

## 2024-05-09 PROCEDURE — C1893: CPT

## 2024-05-09 RX ORDER — FUROSEMIDE 40 MG
40 TABLET ORAL DAILY
Refills: 0 | Status: DISCONTINUED | OUTPATIENT
Start: 2024-05-09 | End: 2024-05-09

## 2024-05-09 RX ORDER — SOTALOL HCL 120 MG
80 TABLET ORAL EVERY 12 HOURS
Refills: 0 | Status: DISCONTINUED | OUTPATIENT
Start: 2024-05-09 | End: 2024-05-09

## 2024-05-09 RX ORDER — FUROSEMIDE 40 MG
20 TABLET ORAL ONCE
Refills: 0 | Status: COMPLETED | OUTPATIENT
Start: 2024-05-09 | End: 2024-05-09

## 2024-05-09 RX ORDER — ATORVASTATIN CALCIUM 80 MG/1
20 TABLET, FILM COATED ORAL AT BEDTIME
Refills: 0 | Status: DISCONTINUED | OUTPATIENT
Start: 2024-05-09 | End: 2024-05-09

## 2024-05-09 RX ORDER — ANASTROZOLE 1 MG/1
1 TABLET ORAL DAILY
Refills: 0 | Status: DISCONTINUED | OUTPATIENT
Start: 2024-05-09 | End: 2024-05-09

## 2024-05-09 RX ORDER — APIXABAN 2.5 MG/1
5 TABLET, FILM COATED ORAL EVERY 12 HOURS
Refills: 0 | Status: DISCONTINUED | OUTPATIENT
Start: 2024-05-09 | End: 2024-05-09

## 2024-05-09 RX ORDER — LISINOPRIL 2.5 MG/1
10 TABLET ORAL DAILY
Refills: 0 | Status: DISCONTINUED | OUTPATIENT
Start: 2024-05-09 | End: 2024-05-09

## 2024-05-09 RX ORDER — DILTIAZEM HCL 120 MG
1 CAPSULE, EXT RELEASE 24 HR ORAL
Refills: 0 | DISCHARGE

## 2024-05-09 RX ADMIN — Medication 20 MILLIGRAM(S): at 17:11

## 2024-05-09 RX ADMIN — APIXABAN 5 MILLIGRAM(S): 2.5 TABLET, FILM COATED ORAL at 17:10

## 2024-05-09 RX ADMIN — Medication 80 MILLIGRAM(S): at 17:10

## 2024-05-09 NOTE — DISCHARGE NOTE NURSING/CASE MANAGEMENT/SOCIAL WORK - PATIENT PORTAL LINK FT
You can access the FollowMyHealth Patient Portal offered by [facility] by registering at the following website: [email or web address]. By joining [facility]’s FollowMyHealth portal, you will also be able to view your health information using other applications (apps) compatible with our system.

## 2024-05-09 NOTE — PRE-ANESTHESIA EVALUATION ADULT - HEIGHT IN CM
Dr Maria M Mendes is a [place] pulmonologist at [facility], [address] [place] [address] [phone] for appointments.     Patient called Dr Summers is out of the [facility] but the clinic he works at wants her to be seen by Pulmonologist She needs a referral to see Dr Maria M Mendes.   Please call and ok to  [phone]   Patient informed of referral info.   Please call patient and give number to schedule.    Please forward to the referrals department.  I do not know who this doctor is, whether they are in network or even where they work.   Spoke with patient.  She was recommended to see Dr. Maria M Mendes--pulmonologist for her shortness of breath.  Dr. Summers does not think her shortness of breath is related to anemia.    Please place referral.   This telephone encounter routed to the referral dept.    Please see messages below.    Please advise, thanks.   [phone]

## 2024-05-09 NOTE — DISCHARGE NOTE PROVIDER - NSDCCPCAREPLAN_GEN_ALL_CORE_FT
PRINCIPAL DISCHARGE DIAGNOSIS  Diagnosis: Chronic atrial fibrillation  Assessment and Plan of Treatment: continue ELiquis and Sotalol  F/u with Dr. Argueta as scheduled, avoid excess caffeinated beverage. Monitor palpitaion, SOB and leg swelling symptoms.      SECONDARY DISCHARGE DIAGNOSES  Diagnosis: HTN (hypertension)  Assessment and Plan of Treatment: Continue with your blood pressure medications; eat a heart healthy diet with low salt diet; exercise regularly (consult with your physician or cardiologist first); maintain a heart healthy weight; if you smoke - quit (A resource to help you stop smoking is the [facility] Tobacco [facility] – phone number [phone].); include healthy ways to manage stress. Continue to follow with your primary care physician or cardiologist.

## 2024-05-09 NOTE — DISCHARGE NOTE NURSING/CASE MANAGEMENT/SOCIAL WORK - NSDCPEFALRISK_GEN_ALL_CORE
For information on Fall & Injury Prevention, visit: [email or web address] OR  [email or web address] OR  https://www.cdc.gov/steadi/patient.html

## 2024-05-09 NOTE — DISCHARGE NOTE PROVIDER - NSDCMRMEDTOKEN_GEN_ALL_CORE_FT
anastrozole 1 mg oral tablet: 1 tab(s) orally once a day  apixaban 5 mg oral tablet: 1 tab(s) orally every 12 hours  atorvastatin 20 mg oral tablet: 1 tab(s) orally once a day (at bedtime)  Lasix 40 mg oral tablet: 1 tab(s) orally once a day as needed for edema  lisinopril 10 mg oral tablet: 1 tab(s) orally once a day   sotalol 80 mg oral tablet: 1 tab(s) orally every 12 hours

## 2024-05-09 NOTE — DISCHARGE NOTE PROVIDER - CARE PROVIDER_API CALL
Aki Argueta  Cardiac Electrophysiology  [address] [place] [id number]  Phone: [phone]  Fax: [phone]  Scheduled Appointment: 06/26/2024 09:40 AM

## 2024-05-09 NOTE — DISCHARGE NOTE PROVIDER - NSDCFUADDINST_GEN_ALL_CORE_FT
WOUND CARE:  The day AFTER your procedure  - Remove the bandage at the site GENTLY, clean with mild soap and water, and pat dry; leave open to air  - You may shower   - DO NOT apply lotions, creams, ointments, powder, perfumes to your incision site  - Check your groin every day. A small amount of bruising or soreness is normal, a bump (smaller than nickel) might be present which is normal  - DO NOT SOAK your site for 1 week (no baths, no pools, no tubs, etc..)    ACTIVITY:  Your procedure was done through your groin  For the next 7 DAYS:  - Limit climbing stairs, no strenuous activity, pushing , pulling, or straining (especially during bowel movements)  - DO NOT LIFT anything 10 lbs or heavier   - You may resume sexual activity in 7 days, unless instructed otherwise    Mild palpitations are normal     Follow heart healthy diet recommended by your doctor, , if you smoke STOP SMOKING (may call [phone] for center of tobacco control if you need assistance).  For the next 24 hours:   - Stay at home and rest, do not drive or operate heavy machinery   Do not drink alcoholic beverages   Do not make important personal or business decisions     ***CALL YOUR DOCTOR ***  IF you have fever, chills, body aches, or severe pain, swelling, redness, heat, yellow drainage from your incision site  IF bleeding or significant new swelling from your puncture site  IF you experience rapid heartbeat or palpitations that cause: lightheadedness, dizziness, or fainting spell.  IF you experience difficulty swallowing, or pain with swallowing   IF unable to get in contact with your doctor, you may call the Cardiology Office at [facility] at [phone]

## 2024-05-09 NOTE — CHART NOTE - NSCHARTNOTEFT_GEN_A_CORE
s/p Afib/flutter ablation   RFV vascade site benign,   VSS, ambulated   Denies chest pain or SOB but feels mild throat discomfort from GA    Plan   Continue to monitor  Lasix 20mg IVP x1   May discharge home if she feels ok otherwise, she can stay over night (as per Dr. Argueta)

## 2024-05-09 NOTE — DISCHARGE NOTE PROVIDER - NSDCFUSCHEDAPPT_GEN_ALL_CORE_FT
Francisco Todd  [facility]  CARDIOLOGY [address]  Scheduled Appointment: 06/17/2024    Aki Argueta  [facility] Physician [facility]  ELECTROPH [address]  Scheduled Appointment: 06/26/2024

## 2024-05-09 NOTE — DISCHARGE NOTE PROVIDER - HOSPITAL COURSE
H79 year old female with PMH of HTN, breast cancer s/p left mastectomy, ADDISON- no treatment, PMR in 2014 treated with steroids, afib s/p prior cardioversion x2, now with recurring a flutter and AT. She presents today to PST prior to scheduled Aflutter Ablation on 5/9/24.   S/p afib/flutter ablation, RFV Vascade site benign, ambulated without issues. Lasix 20mg IVP given at 17:05, plan to discharge home if stable after 18:00.

## 2024-05-09 NOTE — PRE-ANESTHESIA EVALUATION ADULT - BP NONINVASIVE MEAN (MM HG)
Problem: Patient Care Overview  Goal: Plan of Care/Patient Progress Review  PT: Orders received, chart reviewed, eval and treat initiated. Pt was admitted with acute on chronic respiratory failure with A-fib. Pt has been in several different care facilities for chronic respiratory failure since fall of 2017. Most recently discharged home with adult son on 5/18 from [facility]; did not have appropriate care resources and presented back to [facility] a few days after. At baseline, lives with adult children. Is on chronic ventilation; uses Adali lift for mobility to chair at baseline. Pt's daughter does state pt can sit at EOB with assist.    Discharge Planner PT   Patient plan for discharge: Not stated  Current status: Pt completes bed>support chair with ceiling lift and Ax3. On trach vent and maintaining O2 sats well throughout. Vitals stable with move to upright reclined sitting. Pt somewhat agitated with move to chair but calm once in supported position.   Barriers to return to prior living situation: Chronic vent, dependent with all mobility  Recommendations for discharge: TCU  Rationale for recommendations: Pt is close to baseline mobility. Recommend TCU for continued improvement of functional ability and vitals as it sounds like family might not have the essential resources at home to properly care for pt. Will hopefully be able to return home with children in future with appropriate resources.        Entered by: Shyann Parsons 05/23/2018 8:55 AM          102

## 2024-05-10 DIAGNOSIS — I31.8 OTHER SPECIFIED DISEASES OF PERICARDIUM: ICD-10-CM

## 2024-05-10 RX ORDER — COLCHICINE 0.6 MG/1
0.6 CAPSULE ORAL
Qty: 9 | Refills: 0 | Status: DISCONTINUED | COMMUNITY
Start: 2024-05-10 | End: 2024-05-10

## 2024-06-03 ENCOUNTER — RX RENEWAL (OUTPATIENT)
Age: 79
End: 2024-06-03

## 2024-06-03 RX ORDER — LISINOPRIL 10 MG/1
10 TABLET ORAL
Qty: 90 | Refills: 0 | Status: ACTIVE | COMMUNITY
Start: 2024-06-03 | End: 1900-01-01

## 2024-06-04 RX ORDER — APIXABAN 5 MG/1
5 TABLET, FILM COATED ORAL
Qty: 180 | Refills: 0 | Status: ACTIVE | COMMUNITY
Start: 2022-12-08 | End: 1900-01-01

## 2024-06-17 ENCOUNTER — APPOINTMENT (OUTPATIENT)
Dept: CARDIOLOGY | Facility: CLINIC | Age: 79
End: 2024-06-17
Payer: MEDICARE

## 2024-06-17 ENCOUNTER — NON-APPOINTMENT (OUTPATIENT)
Age: 79
End: 2024-06-17

## 2024-06-17 VITALS
DIASTOLIC BLOOD PRESSURE: 68 MMHG | WEIGHT: 202 LBS | OXYGEN SATURATION: 96 % | HEIGHT: 64 IN | BODY MASS INDEX: 34.49 KG/M2 | SYSTOLIC BLOOD PRESSURE: 113 MMHG | RESPIRATION RATE: 18 BRPM | HEART RATE: 74 BPM

## 2024-06-17 DIAGNOSIS — R06.02 SHORTNESS OF BREATH: ICD-10-CM

## 2024-06-17 PROBLEM — N20.0 CALCULUS OF KIDNEY: Chronic | Status: ACTIVE | Noted: 2024-05-03

## 2024-06-17 PROBLEM — G47.33 OBSTRUCTIVE SLEEP APNEA (ADULT) (PEDIATRIC): Chronic | Status: ACTIVE | Noted: 2024-05-03

## 2024-06-17 PROBLEM — I48.92 UNSPECIFIED ATRIAL FLUTTER: Chronic | Status: ACTIVE | Noted: 2024-05-03

## 2024-06-17 PROBLEM — M35.3 POLYMYALGIA RHEUMATICA: Chronic | Status: ACTIVE | Noted: 2024-05-03

## 2024-06-17 PROCEDURE — G2211 COMPLEX E/M VISIT ADD ON: CPT

## 2024-06-17 PROCEDURE — 99214 OFFICE O/P EST MOD 30 MIN: CPT

## 2024-06-17 PROCEDURE — 93000 ELECTROCARDIOGRAM COMPLETE: CPT

## 2024-06-17 RX ORDER — DILTIAZEM HYDROCHLORIDE 180 MG/1
180 CAPSULE, EXTENDED RELEASE ORAL DAILY
Qty: 90 | Refills: 0 | Status: DISCONTINUED | COMMUNITY
Start: 2024-04-05 | End: 2024-06-17

## 2024-06-17 RX ORDER — DILTIAZEM HYDROCHLORIDE 240 MG/1
240 CAPSULE, EXTENDED RELEASE ORAL DAILY
Qty: 30 | Refills: 3 | Status: DISCONTINUED | COMMUNITY
Start: 2024-04-04 | End: 2024-06-17

## 2024-06-17 RX ORDER — ANASTROZOLE TABLETS 1 MG/1
1 TABLET ORAL DAILY
Refills: 0 | Status: DISCONTINUED | COMMUNITY
End: 2024-06-17

## 2024-06-17 NOTE — CARDIOLOGY SUMMARY
Pre-sedation Assessment    HPI: screening  Nurses notes reviewed and agreed. Medications and Allergies reviewed    Physical Exam:  Airway:Normal  Cardiac:Normal  Pulmonary:Normal  Abdomen:Normal    Assessment/Plan:  ASA 2 - Patient with mild systemic disease with no functional limitations  Level of Sedation Plan: Moderate sedation  Post Procedure plan: Return to same level of care  I have explained the risk, benefits, and alternatives to the procedure. The patient understands and agrees to proceed.   Yes    Diaz Marshall  12:13 PM 8/14/2018 [de-identified] : December 8, 2022 AF moderate rate January 5, 2023 A. fib moderate rate February 24, 2023 sinus with APCs nonspecific ST-T change June 17, 2024 sinus with APCs March 2020 for atrial flutter variable rate April 4, 2024 atrial tachycardia 2-1 block January 11, 2024 sinus rhythm 9/11/23 sinus 6/1/23 sinus apcs [de-identified] : November 2022 normal LV systolic function Marked biatrial enlargement January 2023 WAGNER normal LV function December 2023 WAGNER normal LV function simple aortic atheroma [de-identified] : December 2022 minimal coronary atherosclerosis no stenosis calcium score of 1 [de-identified] : Cardioversion 2023

## 2024-06-17 NOTE — DISCUSSION/SUMMARY
[Patient] : the patient [Risks] : risks [Benefits] : benefits [Alternatives] : alternatives [___ Month(s)] : in [unfilled] month(s) [FreeTextEntry1] : Updated medication list.  To have echo and updated lab testing and then discussed with me.  He EP follow-up pending.  Discussed with patient and  questions addressed. [EKG obtained to assist in diagnosis and management of assessed problem(s)] : EKG obtained to assist in diagnosis and management of assessed problem(s)

## 2024-06-17 NOTE — PHYSICAL EXAM
[Normal S1, S2] : normal S1, S2 [Soft] : abdomen soft [No Edema] : no edema [Normal] : moves all extremities, no focal deficits, normal speech [de-identified] : Occasional premature beat

## 2024-06-17 NOTE — HISTORY OF PRESENT ILLNESS
[FreeTextEntry1] :  Follow-up visit had been given colchicine for inflammatory process by EP service.  Feeling short of breath with exertion no palpitations chest pain or significant edema

## 2024-06-17 NOTE — ASSESSMENT
[FreeTextEntry1] : History of cardioversion status post ablation for atrial tach and atrial flutter.  Dyspnea.

## 2024-06-20 LAB
ANION GAP SERPL CALC-SCNC: 12 MMOL/L
BASOPHILS # BLD AUTO: 0.05 K/UL
BASOPHILS NFR BLD AUTO: 0.6 %
BUN SERPL-MCNC: 21 MG/DL
CALCIUM SERPL-MCNC: 9.5 MG/DL
CHLORIDE SERPL-SCNC: 107 MMOL/L
CO2 SERPL-SCNC: 24 MMOL/L
CREAT SERPL-MCNC: 0.8 MG/DL
EGFR: 75 ML/MIN/1.73M2
EOSINOPHIL # BLD AUTO: 0.17 K/UL
EOSINOPHIL NFR BLD AUTO: 2 %
ERYTHROCYTE [SEDIMENTATION RATE] IN BLOOD BY WESTERGREN METHOD: 16 MM/HR
GLUCOSE SERPL-MCNC: 95 MG/DL
HCT VFR BLD CALC: 39.8 %
HGB BLD-MCNC: 12.4 G/DL
IMM GRANULOCYTES NFR BLD AUTO: 0.3 %
LYMPHOCYTES # BLD AUTO: 1.85 K/UL
LYMPHOCYTES NFR BLD AUTO: 21.6 %
MAGNESIUM SERPL-MCNC: 1.9 MG/DL
MAN DIFF?: NORMAL
MCHC RBC-ENTMCNC: 29.7 PG
MCHC RBC-ENTMCNC: 31.2 GM/DL
MCV RBC AUTO: 95.2 FL
MONOCYTES # BLD AUTO: 0.68 K/UL
MONOCYTES NFR BLD AUTO: 7.9 %
NEUTROPHILS # BLD AUTO: 5.8 K/UL
NEUTROPHILS NFR BLD AUTO: 67.6 %
NT-PROBNP SERPL-MCNC: 1333 PG/ML
PLATELET # BLD AUTO: 237 K/UL
POTASSIUM SERPL-SCNC: 4.6 MMOL/L
RBC # BLD: 4.18 M/UL
RBC # FLD: 13.3 %
SODIUM SERPL-SCNC: 143 MMOL/L
T3FREE SERPL-MCNC: 2.45 PG/ML
T4 FREE SERPL-MCNC: 1.2 NG/DL
TSH SERPL-ACNC: 1.12 UIU/ML
WBC # FLD AUTO: 8.58 K/UL

## 2024-06-21 NOTE — DISCHARGE NOTE NURSING/CASE MANAGEMENT/SOCIAL WORK - NSDCVIVACCINE_GEN_ALL_CORE_FT
[FreeTextEntry1] : We discussed the underlying pathology.  Treatment options reviewed.  We discussed medication vs. injection.  Pt has opted for injection.  PT rx provided and he will attend at home in [place] If symptoms persist would recommend to eval with MRI  Cautions discussed.  Questions answered.   Procedure Name: Large Joint Injection / Aspiration: Depomedrol, Lidocaine and Guidance Ultrasound   Large Joint Injection was performed because of pain and inflammation. Depomedrol: An injection of Depomedrol 40 mg , 2 cc. Lidocaine: An injection of Lidocaine 1 mg , 13 cc.   Medication was injected in the left subacromial space Patient has tried OTC's including aspirin, Ibuprofen, Aleve etc. or prescription NSAIDS, and/or exercises at home and/ or physical therapy without satisfactory response. The risks, benefits, and alternatives to steroid injection were explained in full to the patient. Risks outlined include but are not limited to infection, sepsis, bleeding, scarring, skin discoloration, temporary increase in pain, syncopal episode, failure to resolve symptoms, allergic reaction, symptom recurrence, and elevation of blood sugar in diabetics. Patient understood the risks. All questions were answered. After discussion, patient requested an injection. Oral informed consent was obtained.  Sterile preparation with betadine and aseptic technique was utilized for the procedure, including the preparation of the solutions used for the injection. Patient tolerated the procedure well.  Post Procedure Instructions: Patient was advised to call if redness, pain, or fever occur and apply ice for 15 min. out of every hour for the next 12-24 hours as tolerated. Patient was advised to rest the joint(s) for 3 days.  Advised to ice the injection site this evening. Ultrasound Guidance was used for the following reasons: for precise injection in area of tear. Visualization of the needle and placement of injection was performed without complication.
No Vaccines Administered.

## 2024-06-24 ENCOUNTER — APPOINTMENT (OUTPATIENT)
Dept: CARDIOLOGY | Facility: CLINIC | Age: 79
End: 2024-06-24
Payer: MEDICARE

## 2024-06-24 PROCEDURE — 93306 TTE W/DOPPLER COMPLETE: CPT

## 2024-06-26 ENCOUNTER — NON-APPOINTMENT (OUTPATIENT)
Age: 79
End: 2024-06-26

## 2024-06-26 ENCOUNTER — APPOINTMENT (OUTPATIENT)
Dept: ELECTROPHYSIOLOGY | Facility: CLINIC | Age: 79
End: 2024-06-26

## 2024-06-26 ENCOUNTER — OUTPATIENT (OUTPATIENT)
Dept: OUTPATIENT SERVICES | Facility: HOSPITAL | Age: 79
LOS: 1 days | End: 2024-06-26
Payer: MEDICARE

## 2024-06-26 ENCOUNTER — APPOINTMENT (OUTPATIENT)
Dept: ELECTROPHYSIOLOGY | Facility: CLINIC | Age: 79
End: 2024-06-26
Payer: MEDICARE

## 2024-06-26 VITALS
SYSTOLIC BLOOD PRESSURE: 125 MMHG | BODY MASS INDEX: 34.49 KG/M2 | OXYGEN SATURATION: 100 % | DIASTOLIC BLOOD PRESSURE: 76 MMHG | WEIGHT: 202 LBS | HEART RATE: 68 BPM | HEIGHT: 64 IN

## 2024-06-26 DIAGNOSIS — I48.91 UNSPECIFIED ATRIAL FIBRILLATION: ICD-10-CM

## 2024-06-26 DIAGNOSIS — I47.19 OTHER SUPRAVENTRICULAR TACHYCARDIA: ICD-10-CM

## 2024-06-26 DIAGNOSIS — G47.33 OBSTRUCTIVE SLEEP APNEA (ADULT) (PEDIATRIC): ICD-10-CM

## 2024-06-26 DIAGNOSIS — R06.02 SHORTNESS OF BREATH: ICD-10-CM

## 2024-06-26 DIAGNOSIS — Z92.89 PERSONAL HISTORY OF OTHER MEDICAL TREATMENT: Chronic | ICD-10-CM

## 2024-06-26 PROCEDURE — 71046 X-RAY EXAM CHEST 2 VIEWS: CPT

## 2024-06-26 PROCEDURE — 71046 X-RAY EXAM CHEST 2 VIEWS: CPT | Mod: 26

## 2024-06-26 PROCEDURE — 99214 OFFICE O/P EST MOD 30 MIN: CPT

## 2024-06-26 PROCEDURE — 93000 ELECTROCARDIOGRAM COMPLETE: CPT

## 2024-06-26 RX ORDER — UBIQUINOL 100 MG
CAPSULE ORAL
Refills: 0 | Status: ACTIVE | COMMUNITY

## 2024-06-26 RX ORDER — COLCHICINE 0.6 MG/1
0.6 TABLET ORAL
Qty: 9 | Refills: 0 | Status: DISCONTINUED | COMMUNITY
Start: 2024-05-10 | End: 2024-06-26

## 2024-06-26 RX ORDER — ATORVASTATIN CALCIUM 20 MG/1
20 TABLET, FILM COATED ORAL
Refills: 0 | Status: ACTIVE | COMMUNITY

## 2024-06-28 ENCOUNTER — APPOINTMENT (OUTPATIENT)
Dept: CARDIOLOGY | Facility: CLINIC | Age: 79
End: 2024-06-28
Payer: MEDICARE

## 2024-06-28 VITALS
HEART RATE: 78 BPM | HEIGHT: 64 IN | DIASTOLIC BLOOD PRESSURE: 71 MMHG | OXYGEN SATURATION: 97 % | BODY MASS INDEX: 34.49 KG/M2 | RESPIRATION RATE: 19 BRPM | SYSTOLIC BLOOD PRESSURE: 116 MMHG | WEIGHT: 202 LBS

## 2024-06-28 DIAGNOSIS — I50.31 ACUTE DIASTOLIC (CONGESTIVE) HEART FAILURE: ICD-10-CM

## 2024-06-28 PROBLEM — I47.19 ATRIAL TACHYCARDIA: Status: ACTIVE | Noted: 2024-04-04

## 2024-06-28 PROBLEM — G47.33 OSA (OBSTRUCTIVE SLEEP APNEA): Status: ACTIVE | Noted: 2023-01-06

## 2024-06-28 PROBLEM — I48.91 ATRIAL FIBRILLATION: Status: ACTIVE | Noted: 2022-12-07

## 2024-06-28 PROCEDURE — G2211 COMPLEX E/M VISIT ADD ON: CPT

## 2024-06-28 PROCEDURE — 99214 OFFICE O/P EST MOD 30 MIN: CPT

## 2024-07-02 ENCOUNTER — LABORATORY RESULT (OUTPATIENT)
Age: 79
End: 2024-07-02

## 2024-07-02 LAB
ANION GAP SERPL CALC-SCNC: 14 MMOL/L
BUN SERPL-MCNC: 23 MG/DL
CALCIUM SERPL-MCNC: 8.9 MG/DL
CHLORIDE SERPL-SCNC: 108 MMOL/L
CO2 SERPL-SCNC: 21 MMOL/L
CREAT SERPL-MCNC: 0.84 MG/DL
DEPRECATED KAPPA LC FREE/LAMBDA SER: 1.5 RATIO
EGFR: 71 ML/MIN/1.73M2
GLUCOSE SERPL-MCNC: 123 MG/DL
IGA SER QL IEP: 334 MG/DL
IGG SER QL IEP: 910 MG/DL
IGM SER QL IEP: 162 MG/DL
KAPPA LC CSF-MCNC: 1.64 MG/DL
KAPPA LC SERPL-MCNC: 2.46 MG/DL
MAGNESIUM SERPL-MCNC: 1.7 MG/DL
NT-PROBNP SERPL-MCNC: 1747 PG/ML
POTASSIUM SERPL-SCNC: 4.4 MMOL/L
PROT SERPL-MCNC: 6.4 G/DL
SODIUM SERPL-SCNC: 142 MMOL/L

## 2024-08-05 ENCOUNTER — RX RENEWAL (OUTPATIENT)
Age: 79
End: 2024-08-05

## 2024-08-26 ENCOUNTER — APPOINTMENT (OUTPATIENT)
Dept: CARDIOLOGY | Facility: CLINIC | Age: 79
End: 2024-08-26
Payer: MEDICARE

## 2024-08-26 VITALS
OXYGEN SATURATION: 99 % | SYSTOLIC BLOOD PRESSURE: 114 MMHG | DIASTOLIC BLOOD PRESSURE: 72 MMHG | HEIGHT: 64 IN | HEART RATE: 62 BPM

## 2024-08-26 DIAGNOSIS — I50.31 ACUTE DIASTOLIC (CONGESTIVE) HEART FAILURE: ICD-10-CM

## 2024-08-26 DIAGNOSIS — I48.91 UNSPECIFIED ATRIAL FIBRILLATION: ICD-10-CM

## 2024-08-26 PROCEDURE — 93000 ELECTROCARDIOGRAM COMPLETE: CPT

## 2024-08-26 PROCEDURE — 99214 OFFICE O/P EST MOD 30 MIN: CPT

## 2024-08-26 PROCEDURE — G2211 COMPLEX E/M VISIT ADD ON: CPT

## 2024-08-26 NOTE — DISCUSSION/SUMMARY
[Patient] : the patient [Risks] : risks [Benefits] : benefits [Alternatives] : alternatives [___ Month(s)] : in [unfilled] month(s) [FreeTextEntry1] : Discussed in detail with patient and  consideration to infiltrative disease.  Discussed with patient's and  plan for technetium 99 scan and then follow-up with me.  Call me for results.  Possible referral to amyloidosis specialist if positive test..  Low-sodium diet continue diuretic and antihypertensive therapy oral anticoagulation. [EKG obtained to assist in diagnosis and management of assessed problem(s)] : EKG obtained to assist in diagnosis and management of assessed problem(s)

## 2024-08-26 NOTE — HISTORY OF PRESENT ILLNESS
[FreeTextEntry1] :  status post ablation for A-fib.  Had echo with LVH diastolic dysfunction lab work as noted with elevated proBNP Currently wearing event monitor for Dr. Kendall MAGANA.

## 2024-08-26 NOTE — PHYSICAL EXAM
[Normal S1, S2] : normal S1, S2 [No Rub] : no rub [Soft] : abdomen soft [No Edema] : no edema [Normal] : moves all extremities, no focal deficits, normal speech

## 2024-08-26 NOTE — ASSESSMENT
[FreeTextEntry1] : History of cardioversion status post ablation for atrial tach and atrial flutter.  Dyspnea, heart failure with preserved ejection fraction, possible infiltrative cardiomyopathy lab testing as above consideration to cardiac amyloidosis

## 2024-08-26 NOTE — CARDIOLOGY SUMMARY
[de-identified] : December 8, 2022 AF moderate rate January 5, 2023 A. fib moderate rate February 24, 2023 sinus with APCs nonspecific ST-T change June 2024 sinus with APCs June 17, 2024 sinus with APCs March 2020 for atrial flutter variable rate April 4, 2024 atrial tachycardia 2-1 block January 11, 2024 sinus rhythm 9/11/23 sinus 6/1/23 sinus apcs [de-identified] : December 2022 minimal coronary atherosclerosis no stenosis calcium score of 1 [de-identified] : November 2022 normal LV systolic function Marked biatrial enlargement January 2023 WAGNER normal LV function December 2023 WAGNER normal LV function simple aortic atheroma June 2024 normal LVEF mild LVH diastolic dysfunction [de-identified] : Cardioversion 2023 A-fib ablation 2024

## 2024-08-30 ENCOUNTER — RX RENEWAL (OUTPATIENT)
Age: 79
End: 2024-08-30

## 2024-09-04 ENCOUNTER — OUTPATIENT (OUTPATIENT)
Dept: OUTPATIENT SERVICES | Facility: HOSPITAL | Age: 79
LOS: 1 days | End: 2024-09-04
Payer: MEDICARE

## 2024-09-04 ENCOUNTER — APPOINTMENT (OUTPATIENT)
Dept: NUCLEAR MEDICINE | Facility: IMAGING CENTER | Age: 79
End: 2024-09-04
Payer: MEDICARE

## 2024-09-04 DIAGNOSIS — Z92.89 PERSONAL HISTORY OF OTHER MEDICAL TREATMENT: Chronic | ICD-10-CM

## 2024-09-04 DIAGNOSIS — I50.31 ACUTE DIASTOLIC (CONGESTIVE) HEART FAILURE: ICD-10-CM

## 2024-09-04 DIAGNOSIS — Z96.659 PRESENCE OF UNSPECIFIED ARTIFICIAL KNEE JOINT: Chronic | ICD-10-CM

## 2024-09-04 DIAGNOSIS — Z90.12 ACQUIRED ABSENCE OF LEFT BREAST AND NIPPLE: Chronic | ICD-10-CM

## 2024-09-04 DIAGNOSIS — Z98.891 HISTORY OF UTERINE SCAR FROM PREVIOUS SURGERY: Chronic | ICD-10-CM

## 2024-09-04 PROCEDURE — 78830 RP LOCLZJ TUM SPECT W/CT 1: CPT

## 2024-09-04 PROCEDURE — 78830 RP LOCLZJ TUM SPECT W/CT 1: CPT | Mod: 26

## 2024-09-04 PROCEDURE — A9538: CPT

## 2024-09-05 DIAGNOSIS — R91.1 SOLITARY PULMONARY NODULE: ICD-10-CM

## 2024-09-11 ENCOUNTER — NON-APPOINTMENT (OUTPATIENT)
Age: 79
End: 2024-09-11

## 2024-09-11 ENCOUNTER — APPOINTMENT (OUTPATIENT)
Dept: ELECTROPHYSIOLOGY | Facility: CLINIC | Age: 79
End: 2024-09-11
Payer: MEDICARE

## 2024-09-11 VITALS
OXYGEN SATURATION: 99 % | BODY MASS INDEX: 34.49 KG/M2 | SYSTOLIC BLOOD PRESSURE: 117 MMHG | HEIGHT: 64 IN | DIASTOLIC BLOOD PRESSURE: 73 MMHG | HEART RATE: 69 BPM | WEIGHT: 202 LBS

## 2024-09-11 DIAGNOSIS — I47.19 OTHER SUPRAVENTRICULAR TACHYCARDIA: ICD-10-CM

## 2024-09-11 DIAGNOSIS — R06.02 SHORTNESS OF BREATH: ICD-10-CM

## 2024-09-11 DIAGNOSIS — I48.91 UNSPECIFIED ATRIAL FIBRILLATION: ICD-10-CM

## 2024-09-11 PROCEDURE — 93000 ELECTROCARDIOGRAM COMPLETE: CPT

## 2024-09-11 PROCEDURE — 99214 OFFICE O/P EST MOD 30 MIN: CPT

## 2024-09-13 PROCEDURE — 93248 EXT ECG>7D<15D REV&INTERPJ: CPT

## 2024-09-13 NOTE — PRE-OP CHECKLIST - ALLERGY BAND ON
done He lives with his wife. He has two daughters and four grandchildren. 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  He has smoked from age 10 to 55, but quit about 20 years ago.

## 2024-09-15 NOTE — PHYSICAL EXAM

## 2024-09-16 NOTE — HISTORY OF PRESENT ILLNESS
[FreeTextEntry1] : I had the pleasure of seeing Alma Lacey in the arrhythmia clinic at [facility]. As you well know, she is a pleasant 79-year-old woman with a history of hypertension, breast cancer status post mastectomy, and atrial fibrillation.   She first presented with atrial fibrillation in November 2022, which occurred in the setting of pneumonia.  She underwent a successful cardioversion, restoring sinus rhythm.  However, AF recurred in December 2023, and although ablation was recommended, she opted for a repeat cardioversion and a sotalol loading regimen.  In March 2024, she experienced another recurrence, this time presenting with atrial flutter, which persisted until her cardiology visit in April, where she was found with a 2:1 atrial tachycardia with rates in the 100s bpm.  She reported symptoms of fatigue and decreased exercise tolerance.  She underwent and ablation of atypical atrial flutter with reentry around the anterolateral LA scar, and PVI, PWI, CTI ablation on May 9, 2024.  On POD 1, she experienced chest pressure and heaviness with difficulty taking deep breaths, which resolved after a 1-week course of colchicine.  At her June follow-up visit, she reported new-onset exertional fatigue and dyspnea, limiting her ability to walk more than 50 feet.  She was advised to increase her furosemide dosage to daily, a chest x-ray was ordered, and a 2-week Zio patch was placed to monitor for arrhythmia recurrence.    Today, she presents for a follow-up evaluation.  She notes some improvement in shortness of breath, but still experiences it, albeit to a lesser extent.  She is now able to walk from the hospital entrance to the office without stopping, whereas previously she had to stop at least twice.  She still experiences shortness of breath during walks with her  to a field near their home.  She reports significant bilateral lower extremity edema, and has only been taking her furosemide twice a week instead of the previously recommended daily dosage.  She did not have any symptoms while wearing the Zio monitor, and the Zio report was unremarkable, demonstrating a predominantly sinus rhythm with 9 episodes of brief SVT, longest lasting 9 beats, 1.8% PAC burden, and rare VE.  Cardiac amyloid imaging did not suggest the presence of transthyretin cardiac amyloidosis, but incidentally found a small subcentimeter right lower lobe lung nodule which she is going to undergo a follow-up CT in 6 weeks to check for growth.

## 2024-09-16 NOTE — END OF VISIT
[FreeTextEntry3] : I, Dr. Aki Argueta, personally performed the evaluation and management (E/M) services for this established patient who presents today with (a) new problem(s)/exacerbation of (an) existing condition(s).  That E/M includes conducting the examination, assessing all new/exacerbated conditions, and establishing a new plan of care.  Today my NP, Randi Arredondo, was here to observe my evaluation and management services for this new problem/exacerbated condition to be followed going forward. [Time Spent: ___ minutes] : I have spent [unfilled] minutes of time on the encounter which excludes teaching and separately reported services.

## 2024-09-16 NOTE — CARDIOLOGY SUMMARY
[de-identified] : 9/11/2024: Sinus Rhythm at 67bpm 6/26/2024: Sinus Rhythm at 72bpm, occasional PAC 11/16/2023: Sinus rhythm with PACs 2/13/2023: Sinus rhythm with frequent PACs 12/21/2022: Atrial Fibrillation at 71bpm [de-identified] : 2 week ZioXT: Min HR 53bpm, max HR 148bpm, avg HR 71bpm.  Predominantly sinus rhythm.  9 SVT episodes, longest 9 beats at a max rate of 148bpm.  1.8% SVE burden, rare VE.   [de-identified] : 12/20/2022 Pharmacologic Stress Test: - Rare VPDs occurred during recovery - Appropriate HR and BP response - Heart Rhythm: Atrial fibrillation at 79bpm with nonspecific ST-T wave abnormality; Less than 0.5mm downsloping ST segment depression in leads II, III, aVG, and V4-V6 starting at 2 min following regadenoson infusion at 86bpm persisting 3:52 min in recovery.  (Changes did not meet ischemic criteria) - Normal LV size - Medium-sized, mild-moderate defects in the distal anterior, distal anteroseptal, and apical walls that are mostly fixed suggestive of infarct with minimal margot-infarct ischemia.  - Medium-sized, mild-moderate defects in the basal to mid inferior, basal to mid inferoseptal, and distal inferolateral walls that are fixed suggestive of infarct.  - Post-stress gated wall motion analysis was performed (LVEF = 57%; LVEDV = 75ml) revealing mild hypokinesis of the basal to mid inferior and basal inferoseptal walls and reduced systolic thickening of the distal inferolateral, distal anterior, distal anteroseptal, and apical walls.  [de-identified] : 6/24/2024 TTE: EF 71%.  Severe (Grade 3) LV diastolic dysfunction.  Mild LVH.  Increased LV mass and concentric hypertrophy.  Severely dilated LA.  Moderately dilated RA.  Mild calcification of the mitral valve annulus.  Mild-moderate MR.  Trileaflet aortic valve, with calcification of the aortic valve leaflets.  Moderate TR.  Mild HI.  Mild pHTN.   [de-identified] : NM Cardiac Amyloidosis SPECT/CT:  - Cardiac amyloid imaging study is NOT suggestive of transthyretin cardiac amyloidosis. - Small subcentimeter RLL lung nodule is noted.  Further correlation with a follow-up diagnostic chest CT in 6 weeks is recommended.   [de-identified] : 5/9/2024: ablation of atypical atrial flutter with reentry around the anterolateral LA scar, and PVI, PWI, CTI ablation 12/28/2023: DCCV 1/17/2023: [facility]/DCCV

## 2024-09-16 NOTE — HISTORY OF PRESENT ILLNESS
[FreeTextEntry1] : I had the pleasure of seeing Alma Lacey in the arrhythmia clinic at [facility]. As you well know, she is a pleasant 79-year-old woman with a history of hypertension, breast cancer status post mastectomy, and atrial fibrillation.   She first presented with atrial fibrillation in November 2022, which occurred in the setting of pneumonia.  She underwent a successful cardioversion, restoring sinus rhythm.  However, AF recurred in December 2023, and although ablation was recommended, she opted for a repeat cardioversion and a sotalol loading regimen.  In March 2024, she experienced another recurrence, this time presenting with atrial flutter, which persisted until her cardiology visit in April, where she was found with a 2:1 atrial tachycardia with rates in the 100s bpm.  She reported symptoms of fatigue and decreased exercise tolerance.  She underwent and ablation of atypical atrial flutter with reentry around the anterolateral LA scar, and PVI, PWI, CTI ablation on May 9, 2024.  On POD 1, she experienced chest pressure and heaviness with difficulty taking deep breaths, which resolved after a 1-week course of colchicine.  At her June follow-up visit, she reported new-onset exertional fatigue and dyspnea, limiting her ability to walk more than 50 feet.  She was advised to increase her furosemide dosage to daily, a chest x-ray was ordered, and a 2-week Zio patch was placed to monitor for arrhythmia recurrence.    Today, she presents for a follow-up evaluation.  She notes some improvement in shortness of breath, but still experiences it, albeit to a lesser extent.  She is now able to walk from the hospital entrance to the office without stopping, whereas previously she had to stop at least twice.  She still experiences shortness of breath during walks with her  to a field near their home.  She reports significant bilateral lower extremity edema, and has only been taking her furosemide twice a week instead of the previously recommended daily dosage.  She did not have any symptoms while wearing the Zio monitor, and the Zio report was unremarkable, demonstrating a predominantly sinus rhythm with 9 episodes of brief SVT, longest lasting 9 beats, 1.8% PAC burden, and rare VE.  Cardiac amyloid imaging did not suggest the presence of transthyretin cardiac amyloidosis, but incidentally found a small subcentimeter right lower lobe lung nodule which she is going to undergo a follow-up CT in 6 weeks to check for growth.

## 2024-09-16 NOTE — HISTORY OF PRESENT ILLNESS
[FreeTextEntry1] : I had the pleasure of seeing Alma Lacey in the arrhythmia clinic at [facility]. As you well know, she is a pleasant 79-year-old woman with a history of hypertension, breast cancer status post mastectomy, and atrial fibrillation.   She first presented with atrial fibrillation in November 2022, which occurred in the setting of pneumonia.  She underwent a successful cardioversion, restoring sinus rhythm.  However, AF recurred in December 2023, and although ablation was recommended, she opted for a repeat cardioversion and a sotalol loading regimen.  In March 2024, she experienced another recurrence, this time presenting with atrial flutter, which persisted until her cardiology visit in April, where she was found with a 2:1 atrial tachycardia with rates in the 100s bpm.  She reported symptoms of fatigue and decreased exercise tolerance.  She underwent and ablation of atypical atrial flutter with reentry around the anterolateral LA scar, and PVI, PWI, CTI ablation on May 9, 2024.  On POD 1, she experienced chest pressure and heaviness with difficulty taking deep breaths, which resolved after a 1-week course of colchicine.  At her June follow-up visit, she reported new-onset exertional fatigue and dyspnea, limiting her ability to walk more than 50 feet.  She was advised to increase her furosemide dosage to daily, a chest x-ray was ordered, and a 2-week Zio patch was placed to monitor for arrhythmia recurrence.    Today, she presents for a follow-up evaluation.  She notes some improvement in shortness of breath, but still experiences it, albeit to a lesser extent.  She is now able to walk from the hospital entrance to the office without stopping, whereas previously she had to stop at least twice.  She still experiences shortness of breath during walks with her  to a field near their home.  She reports significant bilateral lower extremity edema, and has only been taking her furosemide twice a week instead of the previously recommended daily dosage.  She did not have any symptoms while wearing the Zio monitor, and the Zio report was unremarkable, demonstrating a predominantly sinus rhythm with 9 episodes of brief SVT, longest lasting 9 beats, 1.8% PAC burden, and rare VE.  Cardiac amyloid imaging did not suggest the presence of transthyretin cardiac amyloidosis, but incidentally found a small subcentimeter right lower lobe lung nodule which she is going to undergo a follow-up CT in 6 weeks to check for growth.

## 2024-09-16 NOTE — DISCUSSION/SUMMARY
[EKG obtained to assist in diagnosis and management of assessed problem(s)] : EKG obtained to assist in diagnosis and management of assessed problem(s) [FreeTextEntry1] : In summary, Ms. Lacey is a 79-year-old woman who recently underwent ablation of atypical AFL with reentry around the anterolateral LA scar and PVI, PWI, and CTI ablation on May 9, 2024 for symptomatic atrial arrhythmias.  Her post-ablation course is significant for pericarditis, resolved with a 1-week course of colchicine, and new-onset exertional dyspnea in June.  Her shortness of breath has improved, although not fully resolved.  NM Cardiac Amyloid scan was negative, and her ZioXT event monitoring was unremarkable.    We advised her to adhere to daily furosemide administration to optimize fluid management and alleviate symptoms of congestion.  The sotalol dosage has been adjusted to 40mg twice daily to mitigate potential side effects associated with beta-blocker use (ie. dyspnea, fatigue), while still providing adequate rate control.  We explained that her symptoms may be exacerbated by a stiffened heart muscle, even with a normal ejection fraction.  This stiffening impairs diastolic filling and leads to elevated left atrial pressure.  The impaired filling and elevated pressure can result in increased pulmonary venous pressure, which backs up into the lungs and can lead to shortness of breath.  This can cause significant exertional dyspnea and limited exercise tolerance, as observed in her symptoms.  We advised her to continue engaging in physical exercise as tolerated, as some degree of deconditioning may be contributing to her symptoms.    Ms. Lacey and her  appeared to understand the whole discussion and verbalized that all of their questions were answered to their satisfaction.  Thank you for allowing me to be involved in the care of this pleasant patient. Please feel free to contact me with any questions.

## 2024-09-16 NOTE — CARDIOLOGY SUMMARY
[de-identified] : 9/11/2024: Sinus Rhythm at 67bpm 6/26/2024: Sinus Rhythm at 72bpm, occasional PAC 11/16/2023: Sinus rhythm with PACs 2/13/2023: Sinus rhythm with frequent PACs 12/21/2022: Atrial Fibrillation at 71bpm [de-identified] : 2 week ZioXT: Min HR 53bpm, max HR 148bpm, avg HR 71bpm.  Predominantly sinus rhythm.  9 SVT episodes, longest 9 beats at a max rate of 148bpm.  1.8% SVE burden, rare VE.   [de-identified] : 12/20/2022 Pharmacologic Stress Test: - Rare VPDs occurred during recovery - Appropriate HR and BP response - Heart Rhythm: Atrial fibrillation at 79bpm with nonspecific ST-T wave abnormality; Less than 0.5mm downsloping ST segment depression in leads II, III, aVG, and V4-V6 starting at 2 min following regadenoson infusion at 86bpm persisting 3:52 min in recovery.  (Changes did not meet ischemic criteria) - Normal LV size - Medium-sized, mild-moderate defects in the distal anterior, distal anteroseptal, and apical walls that are mostly fixed suggestive of infarct with minimal margot-infarct ischemia.  - Medium-sized, mild-moderate defects in the basal to mid inferior, basal to mid inferoseptal, and distal inferolateral walls that are fixed suggestive of infarct.  - Post-stress gated wall motion analysis was performed (LVEF = 57%; LVEDV = 75ml) revealing mild hypokinesis of the basal to mid inferior and basal inferoseptal walls and reduced systolic thickening of the distal inferolateral, distal anterior, distal anteroseptal, and apical walls.  [de-identified] : 6/24/2024 TTE: EF 71%.  Severe (Grade 3) LV diastolic dysfunction.  Mild LVH.  Increased LV mass and concentric hypertrophy.  Severely dilated LA.  Moderately dilated RA.  Mild calcification of the mitral valve annulus.  Mild-moderate MR.  Trileaflet aortic valve, with calcification of the aortic valve leaflets.  Moderate TR.  Mild NH.  Mild pHTN.   [de-identified] : NM Cardiac Amyloidosis SPECT/CT:  - Cardiac amyloid imaging study is NOT suggestive of transthyretin cardiac amyloidosis. - Small subcentimeter RLL lung nodule is noted.  Further correlation with a follow-up diagnostic chest CT in 6 weeks is recommended.   [de-identified] : 5/9/2024: ablation of atypical atrial flutter with reentry around the anterolateral LA scar, and PVI, PWI, CTI ablation 12/28/2023: DCCV 1/17/2023: [facility]/DCCV

## 2024-09-16 NOTE — CARDIOLOGY SUMMARY
[de-identified] : 9/11/2024: Sinus Rhythm at 67bpm 6/26/2024: Sinus Rhythm at 72bpm, occasional PAC 11/16/2023: Sinus rhythm with PACs 2/13/2023: Sinus rhythm with frequent PACs 12/21/2022: Atrial Fibrillation at 71bpm [de-identified] : 2 week ZioXT: Min HR 53bpm, max HR 148bpm, avg HR 71bpm.  Predominantly sinus rhythm.  9 SVT episodes, longest 9 beats at a max rate of 148bpm.  1.8% SVE burden, rare VE.   [de-identified] : 12/20/2022 Pharmacologic Stress Test: - Rare VPDs occurred during recovery - Appropriate HR and BP response - Heart Rhythm: Atrial fibrillation at 79bpm with nonspecific ST-T wave abnormality; Less than 0.5mm downsloping ST segment depression in leads II, III, aVG, and V4-V6 starting at 2 min following regadenoson infusion at 86bpm persisting 3:52 min in recovery.  (Changes did not meet ischemic criteria) - Normal LV size - Medium-sized, mild-moderate defects in the distal anterior, distal anteroseptal, and apical walls that are mostly fixed suggestive of infarct with minimal margot-infarct ischemia.  - Medium-sized, mild-moderate defects in the basal to mid inferior, basal to mid inferoseptal, and distal inferolateral walls that are fixed suggestive of infarct.  - Post-stress gated wall motion analysis was performed (LVEF = 57%; LVEDV = 75ml) revealing mild hypokinesis of the basal to mid inferior and basal inferoseptal walls and reduced systolic thickening of the distal inferolateral, distal anterior, distal anteroseptal, and apical walls.  [de-identified] : 6/24/2024 TTE: EF 71%.  Severe (Grade 3) LV diastolic dysfunction.  Mild LVH.  Increased LV mass and concentric hypertrophy.  Severely dilated LA.  Moderately dilated RA.  Mild calcification of the mitral valve annulus.  Mild-moderate MR.  Trileaflet aortic valve, with calcification of the aortic valve leaflets.  Moderate TR.  Mild WI.  Mild pHTN.   [de-identified] : NM Cardiac Amyloidosis SPECT/CT:  - Cardiac amyloid imaging study is NOT suggestive of transthyretin cardiac amyloidosis. - Small subcentimeter RLL lung nodule is noted.  Further correlation with a follow-up diagnostic chest CT in 6 weeks is recommended.   [de-identified] : 5/9/2024: ablation of atypical atrial flutter with reentry around the anterolateral LA scar, and PVI, PWI, CTI ablation 12/28/2023: DCCV 1/17/2023: [facility]/DCCV

## 2024-10-09 NOTE — DISCHARGE NOTE NURSING/CASE MANAGEMENT/SOCIAL WORK - FLU SEASON?
Patient tolerated infusion well. Patient alert and oriented x 3 No distress noted. Vital signs stable. Patient denies any new or worsening pain. Patient denies questions regarding treatment or medication; verbalized understanding, offered patient information and discharge material; patient declined; Patient denies needs, all questions answered.  
Yes...

## 2024-10-17 ENCOUNTER — OUTPATIENT (OUTPATIENT)
Dept: OUTPATIENT SERVICES | Facility: HOSPITAL | Age: 79
LOS: 1 days | End: 2024-10-17
Payer: MEDICARE

## 2024-10-17 ENCOUNTER — APPOINTMENT (OUTPATIENT)
Dept: CT IMAGING | Facility: HOSPITAL | Age: 79
End: 2024-10-17

## 2024-10-17 DIAGNOSIS — Z98.891 HISTORY OF UTERINE SCAR FROM PREVIOUS SURGERY: Chronic | ICD-10-CM

## 2024-10-17 DIAGNOSIS — Z92.89 PERSONAL HISTORY OF OTHER MEDICAL TREATMENT: Chronic | ICD-10-CM

## 2024-10-17 DIAGNOSIS — R91.1 SOLITARY PULMONARY NODULE: ICD-10-CM

## 2024-10-17 DIAGNOSIS — Z90.12 ACQUIRED ABSENCE OF LEFT BREAST AND NIPPLE: Chronic | ICD-10-CM

## 2024-10-17 DIAGNOSIS — Z96.659 PRESENCE OF UNSPECIFIED ARTIFICIAL KNEE JOINT: Chronic | ICD-10-CM

## 2024-10-17 PROCEDURE — 71250 CT THORAX DX C-: CPT | Mod: 26,MH

## 2024-10-29 DIAGNOSIS — R93.89 ABNORMAL FINDINGS ON DIAGNOSTIC IMAGING OF OTHER SPECIFIED BODY STRUCTURES: ICD-10-CM

## 2024-11-05 ENCOUNTER — RX RENEWAL (OUTPATIENT)
Age: 79
End: 2024-11-05

## 2024-11-18 ENCOUNTER — NON-APPOINTMENT (OUTPATIENT)
Age: 79
End: 2024-11-18

## 2024-11-18 ENCOUNTER — APPOINTMENT (OUTPATIENT)
Dept: CARDIOLOGY | Facility: CLINIC | Age: 79
End: 2024-11-18
Payer: MEDICARE

## 2024-11-18 VITALS
OXYGEN SATURATION: 100 % | WEIGHT: 204 LBS | HEART RATE: 74 BPM | SYSTOLIC BLOOD PRESSURE: 131 MMHG | BODY MASS INDEX: 34.83 KG/M2 | DIASTOLIC BLOOD PRESSURE: 78 MMHG | HEIGHT: 64 IN

## 2024-11-18 DIAGNOSIS — I50.31 ACUTE DIASTOLIC (CONGESTIVE) HEART FAILURE: ICD-10-CM

## 2024-11-18 DIAGNOSIS — R93.89 ABNORMAL FINDINGS ON DIAGNOSTIC IMAGING OF OTHER SPECIFIED BODY STRUCTURES: ICD-10-CM

## 2024-11-18 PROCEDURE — G2211 COMPLEX E/M VISIT ADD ON: CPT

## 2024-11-18 PROCEDURE — 99214 OFFICE O/P EST MOD 30 MIN: CPT

## 2024-11-18 PROCEDURE — 93000 ELECTROCARDIOGRAM COMPLETE: CPT

## 2024-11-20 ENCOUNTER — APPOINTMENT (OUTPATIENT)
Dept: PULMONOLOGY | Facility: CLINIC | Age: 79
End: 2024-11-20
Payer: MEDICARE

## 2024-11-20 VITALS
TEMPERATURE: 97 F | BODY MASS INDEX: 34.83 KG/M2 | OXYGEN SATURATION: 96 % | SYSTOLIC BLOOD PRESSURE: 122 MMHG | DIASTOLIC BLOOD PRESSURE: 80 MMHG | HEIGHT: 64 IN | WEIGHT: 204 LBS | HEART RATE: 76 BPM

## 2024-11-20 DIAGNOSIS — R93.89 ABNORMAL FINDINGS ON DIAGNOSTIC IMAGING OF OTHER SPECIFIED BODY STRUCTURES: ICD-10-CM

## 2024-11-20 DIAGNOSIS — G47.33 OBSTRUCTIVE SLEEP APNEA (ADULT) (PEDIATRIC): ICD-10-CM

## 2024-11-20 PROCEDURE — 99215 OFFICE O/P EST HI 40 MIN: CPT

## 2024-11-20 PROCEDURE — 71250 CT THORAX DX C-: CPT

## 2024-11-20 PROCEDURE — G2211 COMPLEX E/M VISIT ADD ON: CPT

## 2024-11-27 RX ORDER — DAPAGLIFLOZIN 10 MG/1
10 TABLET, FILM COATED ORAL
Qty: 90 | Refills: 2 | Status: ACTIVE | COMMUNITY
Start: 2024-11-18 | End: 1900-01-01

## 2024-12-05 ENCOUNTER — APPOINTMENT (OUTPATIENT)
Dept: CARDIOLOGY | Facility: CLINIC | Age: 79
End: 2024-12-05

## 2025-01-06 ENCOUNTER — APPOINTMENT (OUTPATIENT)
Dept: DERMATOLOGY | Facility: CLINIC | Age: 80
End: 2025-01-06
Payer: MEDICARE

## 2025-01-06 VITALS — WEIGHT: 202 LBS | BODY MASS INDEX: 34.67 KG/M2

## 2025-01-06 DIAGNOSIS — Z12.83 ENCOUNTER FOR SCREENING FOR MALIGNANT NEOPLASM OF SKIN: ICD-10-CM

## 2025-01-06 DIAGNOSIS — L82.0 INFLAMED SEBORRHEIC KERATOSIS: ICD-10-CM

## 2025-01-06 DIAGNOSIS — L81.4 OTHER MELANIN HYPERPIGMENTATION: ICD-10-CM

## 2025-01-06 DIAGNOSIS — D22.9 MELANOCYTIC NEVI, UNSPECIFIED: ICD-10-CM

## 2025-01-06 PROCEDURE — 99213 OFFICE O/P EST LOW 20 MIN: CPT | Mod: 25

## 2025-01-06 PROCEDURE — 17110 DESTRUCTION B9 LES UP TO 14: CPT

## 2025-01-13 ENCOUNTER — APPOINTMENT (OUTPATIENT)
Dept: CARDIOLOGY | Facility: CLINIC | Age: 80
End: 2025-01-13
Payer: MEDICARE

## 2025-01-13 ENCOUNTER — NON-APPOINTMENT (OUTPATIENT)
Age: 80
End: 2025-01-13

## 2025-01-13 VITALS — SYSTOLIC BLOOD PRESSURE: 114 MMHG | OXYGEN SATURATION: 98 % | DIASTOLIC BLOOD PRESSURE: 69 MMHG | HEART RATE: 69 BPM

## 2025-01-13 DIAGNOSIS — I48.91 UNSPECIFIED ATRIAL FIBRILLATION: ICD-10-CM

## 2025-01-13 DIAGNOSIS — R06.02 SHORTNESS OF BREATH: ICD-10-CM

## 2025-01-13 DIAGNOSIS — I50.31 ACUTE DIASTOLIC (CONGESTIVE) HEART FAILURE: ICD-10-CM

## 2025-01-13 PROCEDURE — 99214 OFFICE O/P EST MOD 30 MIN: CPT

## 2025-01-13 PROCEDURE — 93000 ELECTROCARDIOGRAM COMPLETE: CPT

## 2025-01-13 PROCEDURE — G2211 COMPLEX E/M VISIT ADD ON: CPT

## 2025-01-22 ENCOUNTER — APPOINTMENT (OUTPATIENT)
Dept: ELECTROPHYSIOLOGY | Facility: CLINIC | Age: 80
End: 2025-01-22

## 2025-02-25 ENCOUNTER — APPOINTMENT (OUTPATIENT)
Dept: CT IMAGING | Facility: HOSPITAL | Age: 80
End: 2025-02-25
Payer: MEDICARE

## 2025-02-25 ENCOUNTER — APPOINTMENT (OUTPATIENT)
Dept: ULTRASOUND IMAGING | Facility: HOSPITAL | Age: 80
End: 2025-02-25
Payer: MEDICARE

## 2025-02-25 ENCOUNTER — OUTPATIENT (OUTPATIENT)
Dept: OUTPATIENT SERVICES | Facility: HOSPITAL | Age: 80
LOS: 1 days | End: 2025-02-25
Payer: MEDICARE

## 2025-02-25 DIAGNOSIS — Z90.12 ACQUIRED ABSENCE OF LEFT BREAST AND NIPPLE: Chronic | ICD-10-CM

## 2025-02-25 DIAGNOSIS — Z92.89 PERSONAL HISTORY OF OTHER MEDICAL TREATMENT: Chronic | ICD-10-CM

## 2025-02-25 DIAGNOSIS — Z96.659 PRESENCE OF UNSPECIFIED ARTIFICIAL KNEE JOINT: Chronic | ICD-10-CM

## 2025-02-25 DIAGNOSIS — Z98.891 HISTORY OF UTERINE SCAR FROM PREVIOUS SURGERY: Chronic | ICD-10-CM

## 2025-02-25 DIAGNOSIS — Z00.8 ENCOUNTER FOR OTHER GENERAL EXAMINATION: ICD-10-CM

## 2025-02-25 PROCEDURE — 74176 CT ABD & PELVIS W/O CONTRAST: CPT | Mod: 26

## 2025-02-25 PROCEDURE — 76700 US EXAM ABDOM COMPLETE: CPT | Mod: 26

## 2025-02-25 PROCEDURE — 74176 CT ABD & PELVIS W/O CONTRAST: CPT

## 2025-02-25 PROCEDURE — 76700 US EXAM ABDOM COMPLETE: CPT

## 2025-03-26 ENCOUNTER — APPOINTMENT (OUTPATIENT)
Dept: ELECTROPHYSIOLOGY | Facility: CLINIC | Age: 80
End: 2025-03-26
Payer: MEDICARE

## 2025-03-26 ENCOUNTER — NON-APPOINTMENT (OUTPATIENT)
Age: 80
End: 2025-03-26

## 2025-03-26 VITALS
HEIGHT: 64 IN | OXYGEN SATURATION: 99 % | SYSTOLIC BLOOD PRESSURE: 118 MMHG | WEIGHT: 204 LBS | RESPIRATION RATE: 14 BRPM | HEART RATE: 65 BPM | BODY MASS INDEX: 34.83 KG/M2 | DIASTOLIC BLOOD PRESSURE: 77 MMHG

## 2025-03-26 DIAGNOSIS — I47.19 OTHER SUPRAVENTRICULAR TACHYCARDIA: ICD-10-CM

## 2025-03-26 DIAGNOSIS — I48.91 UNSPECIFIED ATRIAL FIBRILLATION: ICD-10-CM

## 2025-03-26 PROCEDURE — 93000 ELECTROCARDIOGRAM COMPLETE: CPT

## 2025-03-26 PROCEDURE — 99214 OFFICE O/P EST MOD 30 MIN: CPT

## 2025-03-26 RX ORDER — CYANOCOBALAMIN (VITAMIN B-12) 1000 MCG
TABLET ORAL
Refills: 0 | Status: ACTIVE | COMMUNITY

## 2025-04-28 ENCOUNTER — APPOINTMENT (OUTPATIENT)
Dept: CARDIOLOGY | Facility: CLINIC | Age: 80
End: 2025-04-28
Payer: MEDICARE

## 2025-04-28 VITALS
HEART RATE: 75 BPM | OXYGEN SATURATION: 98 % | DIASTOLIC BLOOD PRESSURE: 66 MMHG | SYSTOLIC BLOOD PRESSURE: 105 MMHG | HEIGHT: 64 IN

## 2025-04-28 DIAGNOSIS — R06.02 SHORTNESS OF BREATH: ICD-10-CM

## 2025-04-28 DIAGNOSIS — I48.91 UNSPECIFIED ATRIAL FIBRILLATION: ICD-10-CM

## 2025-04-28 DIAGNOSIS — I50.31 ACUTE DIASTOLIC (CONGESTIVE) HEART FAILURE: ICD-10-CM

## 2025-04-28 PROCEDURE — 99214 OFFICE O/P EST MOD 30 MIN: CPT

## 2025-04-28 PROCEDURE — 93000 ELECTROCARDIOGRAM COMPLETE: CPT

## 2025-04-28 PROCEDURE — G2211 COMPLEX E/M VISIT ADD ON: CPT

## 2025-04-28 RX ORDER — EPLERENONE 25 MG/1
25 TABLET, COATED ORAL DAILY
Qty: 90 | Refills: 3 | Status: ACTIVE | COMMUNITY
Start: 2025-04-28 | End: 1900-01-01

## 2025-06-18 ENCOUNTER — APPOINTMENT (OUTPATIENT)
Dept: PULMONOLOGY | Facility: CLINIC | Age: 80
End: 2025-06-18

## 2025-06-26 ENCOUNTER — APPOINTMENT (OUTPATIENT)
Dept: CARDIOLOGY | Facility: CLINIC | Age: 80
End: 2025-06-26
Payer: MEDICARE

## 2025-06-26 ENCOUNTER — NON-APPOINTMENT (OUTPATIENT)
Age: 80
End: 2025-06-26

## 2025-06-26 VITALS
BODY MASS INDEX: 34.83 KG/M2 | HEART RATE: 73 BPM | OXYGEN SATURATION: 98 % | DIASTOLIC BLOOD PRESSURE: 64 MMHG | SYSTOLIC BLOOD PRESSURE: 114 MMHG | WEIGHT: 204 LBS | HEIGHT: 64 IN

## 2025-06-26 PROCEDURE — 99214 OFFICE O/P EST MOD 30 MIN: CPT

## 2025-06-26 PROCEDURE — 93000 ELECTROCARDIOGRAM COMPLETE: CPT

## 2025-08-13 ENCOUNTER — APPOINTMENT (OUTPATIENT)
Dept: ELECTROPHYSIOLOGY | Facility: CLINIC | Age: 80
End: 2025-08-13

## 2025-08-13 VITALS
OXYGEN SATURATION: 98 % | HEART RATE: 99 BPM | HEIGHT: 64 IN | WEIGHT: 199 LBS | BODY MASS INDEX: 33.97 KG/M2 | SYSTOLIC BLOOD PRESSURE: 97 MMHG | DIASTOLIC BLOOD PRESSURE: 67 MMHG

## 2025-08-13 PROCEDURE — 99214 OFFICE O/P EST MOD 30 MIN: CPT

## 2025-08-13 PROCEDURE — 93000 ELECTROCARDIOGRAM COMPLETE: CPT

## 2025-08-13 RX ORDER — SOTALOL HYDROCHLORIDE TABLES AF 80 MG/1
80 TABLET ORAL
Refills: 0 | Status: ACTIVE | COMMUNITY
Start: 2025-08-13

## 2025-08-15 ENCOUNTER — NON-APPOINTMENT (OUTPATIENT)
Age: 80
End: 2025-08-15